# Patient Record
Sex: FEMALE | Employment: UNEMPLOYED | URBAN - METROPOLITAN AREA
[De-identification: names, ages, dates, MRNs, and addresses within clinical notes are randomized per-mention and may not be internally consistent; named-entity substitution may affect disease eponyms.]

---

## 2019-03-04 ENCOUNTER — OFFICE VISIT (OUTPATIENT)
Dept: URGENT CARE | Facility: CLINIC | Age: 54
End: 2019-03-04
Payer: COMMERCIAL

## 2019-03-04 VITALS
WEIGHT: 157.63 LBS | BODY MASS INDEX: 24.74 KG/M2 | RESPIRATION RATE: 16 BRPM | OXYGEN SATURATION: 99 % | HEART RATE: 84 BPM | SYSTOLIC BLOOD PRESSURE: 143 MMHG | DIASTOLIC BLOOD PRESSURE: 91 MMHG | HEIGHT: 67 IN | TEMPERATURE: 98 F

## 2019-03-04 DIAGNOSIS — B02.8 HERPES ZOSTER WITH COMPLICATION: Primary | ICD-10-CM

## 2019-03-04 PROCEDURE — 99204 OFFICE O/P NEW MOD 45 MIN: CPT | Mod: S$GLB,,, | Performed by: NURSE PRACTITIONER

## 2019-03-04 PROCEDURE — 99204 PR OFFICE/OUTPT VISIT, NEW, LEVL IV, 45-59 MIN: ICD-10-PCS | Mod: S$GLB,,, | Performed by: NURSE PRACTITIONER

## 2019-03-04 RX ORDER — GABAPENTIN 100 MG/1
300 CAPSULE ORAL 3 TIMES DAILY PRN
Qty: 30 CAPSULE | Refills: 0 | Status: SHIPPED | OUTPATIENT
Start: 2019-03-04 | End: 2022-09-14

## 2019-03-04 RX ORDER — ONDANSETRON 4 MG/1
4 TABLET, ORALLY DISINTEGRATING ORAL EVERY 6 HOURS PRN
Qty: 30 TABLET | Refills: 0 | Status: SHIPPED | OUTPATIENT
Start: 2019-03-04 | End: 2022-09-14

## 2019-03-04 RX ORDER — VALACYCLOVIR HYDROCHLORIDE 1 G/1
1000 TABLET, FILM COATED ORAL 3 TIMES DAILY
Qty: 21 TABLET | Refills: 0 | Status: ON HOLD | OUTPATIENT
Start: 2019-03-04 | End: 2022-11-08 | Stop reason: CLARIF

## 2019-03-04 NOTE — PATIENT INSTRUCTIONS

## 2019-03-04 NOTE — PROGRESS NOTES
"Subjective:       Patient ID: Rut Hurtado is a 53 y.o. female.    Vitals:  height is 5' 7" (1.702 m) and weight is 71.5 kg (157 lb 9.6 oz). Her temperature is 97.5 °F (36.4 °C). Her blood pressure is 143/91 (abnormal) and her pulse is 84. Her respiration is 16 and oxygen saturation is 99%.     Chief Complaint: Rash    Pain in right shoulder started a few days ago then small rash started on forearm and spread to hand c/o pain and itching   Ibuporfen did not help with pain       Rash   This is a new problem. The current episode started in the past 7 days. The problem has been gradually worsening since onset. The affected locations include the right hand and right arm. The rash is characterized by pain and itchiness. Pertinent negatives include no congestion, cough, diarrhea, fatigue, fever, shortness of breath, sore throat or vomiting. (Nausea ) Past treatments include antibiotic cream. The treatment provided mild relief.       Constitution: Negative for chills, fatigue and fever.   HENT: Negative for congestion and sore throat.    Neck: Negative for painful lymph nodes.   Cardiovascular: Negative for chest pain and leg swelling.   Eyes: Negative for double vision and blurred vision.   Respiratory: Negative for cough and shortness of breath.    Gastrointestinal: Negative for nausea, vomiting and diarrhea.   Genitourinary: Negative for dysuria, frequency, urgency and history of kidney stones.   Musculoskeletal: Negative for joint pain, joint swelling, muscle cramps and muscle ache.   Skin: Positive for rash, lesion and erythema. Negative for color change, pale and bruising.   Allergic/Immunologic: Negative for seasonal allergies.   Neurological: Negative for dizziness, history of vertigo, light-headedness, passing out and headaches.   Hematologic/Lymphatic: Negative for swollen lymph nodes.   Psychiatric/Behavioral: Negative for nervous/anxious, sleep disturbance and depression. The patient is not nervous/anxious.     "    Objective:      Physical Exam   Constitutional: She is oriented to person, place, and time. She appears well-developed and well-nourished. She is cooperative.  Non-toxic appearance. She does not appear ill. No distress.   HENT:   Head: Normocephalic and atraumatic.   Right Ear: Hearing, tympanic membrane, external ear and ear canal normal.   Left Ear: Hearing, tympanic membrane, external ear and ear canal normal.   Nose: Nose normal. No mucosal edema, rhinorrhea or nasal deformity. No epistaxis. Right sinus exhibits no maxillary sinus tenderness and no frontal sinus tenderness. Left sinus exhibits no maxillary sinus tenderness and no frontal sinus tenderness.   Mouth/Throat: Uvula is midline, oropharynx is clear and moist and mucous membranes are normal. No trismus in the jaw. Normal dentition. No uvula swelling. No posterior oropharyngeal erythema.   Eyes: Conjunctivae and lids are normal. Right eye exhibits no discharge. Left eye exhibits no discharge. No scleral icterus.   Sclera clear bilat   Neck: Trachea normal, normal range of motion, full passive range of motion without pain and phonation normal. Neck supple.   Cardiovascular: Normal rate, regular rhythm, normal heart sounds, intact distal pulses and normal pulses.   Pulmonary/Chest: Effort normal and breath sounds normal. No respiratory distress.   Abdominal: Soft. Normal appearance and bowel sounds are normal. She exhibits no distension, no pulsatile midline mass and no mass. There is no tenderness.   Musculoskeletal: Normal range of motion. She exhibits no edema or deformity.   Neurological: She is alert and oriented to person, place, and time. She exhibits normal muscle tone. Coordination normal.   Skin: Skin is warm, dry and intact. Rash noted. She is not diaphoretic. There is erythema. No pallor.        Psychiatric: She has a normal mood and affect. Her speech is normal and behavior is normal. Judgment and thought content normal. Cognition and  memory are normal.   Nursing note and vitals reviewed.      Assessment:       1. Herpes zoster with complication        Plan:         Herpes zoster with complication    Other orders  -     valACYclovir (VALTREX) 1000 MG tablet; Take 1 tablet (1,000 mg total) by mouth 3 (three) times daily. for 7 days  Dispense: 21 tablet; Refill: 0  -     gabapentin (NEURONTIN) 100 MG capsule; Take 3 capsules (300 mg total) by mouth 3 (three) times daily as needed.  Dispense: 30 capsule; Refill: 0  -     ondansetron (ZOFRAN-ODT) 4 MG TbDL; Take 1 tablet (4 mg total) by mouth every 6 (six) hours as needed.  Dispense: 30 tablet; Refill: 0

## 2019-12-16 PROBLEM — Z78.0 MENOPAUSE: Status: ACTIVE | Noted: 2019-12-16

## 2019-12-16 PROBLEM — E78.00 PURE HYPERCHOLESTEROLEMIA: Status: ACTIVE | Noted: 2019-12-16

## 2020-02-10 PROBLEM — R13.19 ESOPHAGEAL DYSPHAGIA: Status: ACTIVE | Noted: 2020-02-10

## 2020-02-10 PROBLEM — N63.0 BREAST MASS: Status: ACTIVE | Noted: 2020-02-10

## 2020-03-03 PROBLEM — R53.1 WEAKNESS: Status: ACTIVE | Noted: 2020-03-03

## 2021-10-07 ENCOUNTER — OFFICE VISIT (OUTPATIENT)
Dept: URGENT CARE | Facility: CLINIC | Age: 56
End: 2021-10-07
Payer: COMMERCIAL

## 2021-10-07 VITALS
HEART RATE: 81 BPM | DIASTOLIC BLOOD PRESSURE: 84 MMHG | TEMPERATURE: 99 F | RESPIRATION RATE: 20 BRPM | OXYGEN SATURATION: 98 % | HEIGHT: 67 IN | SYSTOLIC BLOOD PRESSURE: 134 MMHG | WEIGHT: 158 LBS | BODY MASS INDEX: 24.8 KG/M2

## 2021-10-07 DIAGNOSIS — S82.839A AVULSION FRACTURE OF DISTAL FIBULA: Primary | ICD-10-CM

## 2021-10-07 DIAGNOSIS — M25.571 ACUTE RIGHT ANKLE PAIN: ICD-10-CM

## 2021-10-07 PROCEDURE — 73610 X-RAY EXAM OF ANKLE: CPT | Mod: RT,S$GLB,, | Performed by: RADIOLOGY

## 2021-10-07 PROCEDURE — 73630 XR FOOT COMPLETE 3 VIEW RIGHT: ICD-10-PCS | Mod: RT,S$GLB,, | Performed by: RADIOLOGY

## 2021-10-07 PROCEDURE — 99214 PR OFFICE/OUTPT VISIT, EST, LEVL IV, 30-39 MIN: ICD-10-PCS | Mod: 25,S$GLB,, | Performed by: NURSE PRACTITIONER

## 2021-10-07 PROCEDURE — 29515 SPLINT APPLICATION: ICD-10-PCS | Mod: RT,S$GLB,, | Performed by: NURSE PRACTITIONER

## 2021-10-07 PROCEDURE — 3008F PR BODY MASS INDEX (BMI) DOCUMENTED: ICD-10-PCS | Mod: CPTII,S$GLB,, | Performed by: NURSE PRACTITIONER

## 2021-10-07 PROCEDURE — 3079F PR MOST RECENT DIASTOLIC BLOOD PRESSURE 80-89 MM HG: ICD-10-PCS | Mod: CPTII,S$GLB,, | Performed by: NURSE PRACTITIONER

## 2021-10-07 PROCEDURE — 29515 APPLICATION SHORT LEG SPLINT: CPT | Mod: RT,S$GLB,, | Performed by: NURSE PRACTITIONER

## 2021-10-07 PROCEDURE — 3075F SYST BP GE 130 - 139MM HG: CPT | Mod: CPTII,S$GLB,, | Performed by: NURSE PRACTITIONER

## 2021-10-07 PROCEDURE — 1159F PR MEDICATION LIST DOCUMENTED IN MEDICAL RECORD: ICD-10-PCS | Mod: CPTII,S$GLB,, | Performed by: NURSE PRACTITIONER

## 2021-10-07 PROCEDURE — 73610 XR ANKLE COMPLETE 3 VIEW RIGHT: ICD-10-PCS | Mod: RT,S$GLB,, | Performed by: RADIOLOGY

## 2021-10-07 PROCEDURE — 99214 OFFICE O/P EST MOD 30 MIN: CPT | Mod: 25,S$GLB,, | Performed by: NURSE PRACTITIONER

## 2021-10-07 PROCEDURE — 3079F DIAST BP 80-89 MM HG: CPT | Mod: CPTII,S$GLB,, | Performed by: NURSE PRACTITIONER

## 2021-10-07 PROCEDURE — 73630 X-RAY EXAM OF FOOT: CPT | Mod: RT,S$GLB,, | Performed by: RADIOLOGY

## 2021-10-07 PROCEDURE — 1160F RVW MEDS BY RX/DR IN RCRD: CPT | Mod: CPTII,S$GLB,, | Performed by: NURSE PRACTITIONER

## 2021-10-07 PROCEDURE — 3008F BODY MASS INDEX DOCD: CPT | Mod: CPTII,S$GLB,, | Performed by: NURSE PRACTITIONER

## 2021-10-07 PROCEDURE — 3075F PR MOST RECENT SYSTOLIC BLOOD PRESS GE 130-139MM HG: ICD-10-PCS | Mod: CPTII,S$GLB,, | Performed by: NURSE PRACTITIONER

## 2021-10-07 PROCEDURE — 1160F PR REVIEW ALL MEDS BY PRESCRIBER/CLIN PHARMACIST DOCUMENTED: ICD-10-PCS | Mod: CPTII,S$GLB,, | Performed by: NURSE PRACTITIONER

## 2021-10-07 PROCEDURE — 29515 PR APPLY LOWER LEG SPLINT: ICD-10-PCS | Mod: RT,S$GLB,, | Performed by: EMERGENCY MEDICINE

## 2021-10-07 PROCEDURE — 29515 APPLICATION SHORT LEG SPLINT: CPT | Mod: RT,S$GLB,, | Performed by: EMERGENCY MEDICINE

## 2021-10-07 PROCEDURE — 1159F MED LIST DOCD IN RCRD: CPT | Mod: CPTII,S$GLB,, | Performed by: NURSE PRACTITIONER

## 2021-10-12 ENCOUNTER — OFFICE VISIT (OUTPATIENT)
Dept: ORTHOPEDICS | Facility: CLINIC | Age: 56
End: 2021-10-12
Payer: COMMERCIAL

## 2021-10-12 VITALS — RESPIRATION RATE: 18 BRPM | HEIGHT: 67 IN | BODY MASS INDEX: 24.8 KG/M2 | WEIGHT: 158 LBS

## 2021-10-12 DIAGNOSIS — S82.61XA CLOSED FRACTURE OF DISTAL LATERAL MALLEOLUS OF RIGHT FIBULA, INITIAL ENCOUNTER: Primary | ICD-10-CM

## 2021-10-12 PROCEDURE — 1159F MED LIST DOCD IN RCRD: CPT | Mod: CPTII,S$GLB,, | Performed by: ORTHOPAEDIC SURGERY

## 2021-10-12 PROCEDURE — 99203 PR OFFICE/OUTPT VISIT, NEW, LEVL III, 30-44 MIN: ICD-10-PCS | Mod: S$GLB,,, | Performed by: ORTHOPAEDIC SURGERY

## 2021-10-12 PROCEDURE — 99999 PR PBB SHADOW E&M-EST. PATIENT-LVL III: ICD-10-PCS | Mod: PBBFAC,,, | Performed by: ORTHOPAEDIC SURGERY

## 2021-10-12 PROCEDURE — 99999 PR PBB SHADOW E&M-EST. PATIENT-LVL III: CPT | Mod: PBBFAC,,, | Performed by: ORTHOPAEDIC SURGERY

## 2021-10-12 PROCEDURE — 1160F RVW MEDS BY RX/DR IN RCRD: CPT | Mod: CPTII,S$GLB,, | Performed by: ORTHOPAEDIC SURGERY

## 2021-10-12 PROCEDURE — 1160F PR REVIEW ALL MEDS BY PRESCRIBER/CLIN PHARMACIST DOCUMENTED: ICD-10-PCS | Mod: CPTII,S$GLB,, | Performed by: ORTHOPAEDIC SURGERY

## 2021-10-12 PROCEDURE — 1159F PR MEDICATION LIST DOCUMENTED IN MEDICAL RECORD: ICD-10-PCS | Mod: CPTII,S$GLB,, | Performed by: ORTHOPAEDIC SURGERY

## 2021-10-12 PROCEDURE — 3008F BODY MASS INDEX DOCD: CPT | Mod: CPTII,S$GLB,, | Performed by: ORTHOPAEDIC SURGERY

## 2021-10-12 PROCEDURE — 99203 OFFICE O/P NEW LOW 30 MIN: CPT | Mod: S$GLB,,, | Performed by: ORTHOPAEDIC SURGERY

## 2021-10-12 PROCEDURE — 3008F PR BODY MASS INDEX (BMI) DOCUMENTED: ICD-10-PCS | Mod: CPTII,S$GLB,, | Performed by: ORTHOPAEDIC SURGERY

## 2021-11-02 ENCOUNTER — HOSPITAL ENCOUNTER (OUTPATIENT)
Dept: RADIOLOGY | Facility: HOSPITAL | Age: 56
Discharge: HOME | End: 2021-11-02
Attending: ORTHOPAEDIC SURGERY
Payer: COMMERCIAL

## 2021-11-02 ENCOUNTER — OFFICE VISIT (OUTPATIENT)
Dept: ORTHOPEDICS | Facility: CLINIC | Age: 56
End: 2021-11-02
Payer: COMMERCIAL

## 2021-11-02 VITALS — HEIGHT: 67 IN | RESPIRATION RATE: 18 BRPM | BODY MASS INDEX: 24.8 KG/M2 | WEIGHT: 158 LBS

## 2021-11-02 DIAGNOSIS — S82.61XA CLOSED FRACTURE OF DISTAL LATERAL MALLEOLUS OF RIGHT FIBULA, INITIAL ENCOUNTER: ICD-10-CM

## 2021-11-02 DIAGNOSIS — S82.61XA CLOSED FRACTURE OF DISTAL LATERAL MALLEOLUS OF RIGHT FIBULA, INITIAL ENCOUNTER: Primary | ICD-10-CM

## 2021-11-02 PROCEDURE — 73610 XR ANKLE COMPLETE 3 VIEW RIGHT: ICD-10-PCS | Mod: 26,RT,, | Performed by: RADIOLOGY

## 2021-11-02 PROCEDURE — 99213 OFFICE O/P EST LOW 20 MIN: CPT | Mod: S$GLB,,, | Performed by: ORTHOPAEDIC SURGERY

## 2021-11-02 PROCEDURE — 99213 PR OFFICE/OUTPT VISIT, EST, LEVL III, 20-29 MIN: ICD-10-PCS | Mod: S$GLB,,, | Performed by: ORTHOPAEDIC SURGERY

## 2021-11-02 PROCEDURE — 3008F BODY MASS INDEX DOCD: CPT | Mod: CPTII,S$GLB,, | Performed by: ORTHOPAEDIC SURGERY

## 2021-11-02 PROCEDURE — 3008F PR BODY MASS INDEX (BMI) DOCUMENTED: ICD-10-PCS | Mod: CPTII,S$GLB,, | Performed by: ORTHOPAEDIC SURGERY

## 2021-11-02 PROCEDURE — 73610 X-RAY EXAM OF ANKLE: CPT | Mod: TC,PN,RT

## 2021-11-02 PROCEDURE — 1159F MED LIST DOCD IN RCRD: CPT | Mod: CPTII,S$GLB,, | Performed by: ORTHOPAEDIC SURGERY

## 2021-11-02 PROCEDURE — 99999 PR PBB SHADOW E&M-EST. PATIENT-LVL III: CPT | Mod: PBBFAC,,, | Performed by: ORTHOPAEDIC SURGERY

## 2021-11-02 PROCEDURE — 99999 PR PBB SHADOW E&M-EST. PATIENT-LVL III: ICD-10-PCS | Mod: PBBFAC,,, | Performed by: ORTHOPAEDIC SURGERY

## 2021-11-02 PROCEDURE — 73610 X-RAY EXAM OF ANKLE: CPT | Mod: 26,RT,, | Performed by: RADIOLOGY

## 2021-11-02 PROCEDURE — 1159F PR MEDICATION LIST DOCUMENTED IN MEDICAL RECORD: ICD-10-PCS | Mod: CPTII,S$GLB,, | Performed by: ORTHOPAEDIC SURGERY

## 2021-11-30 ENCOUNTER — HOSPITAL ENCOUNTER (OUTPATIENT)
Dept: RADIOLOGY | Facility: HOSPITAL | Age: 56
Discharge: HOME | End: 2021-11-30
Attending: ORTHOPAEDIC SURGERY
Payer: COMMERCIAL

## 2021-11-30 ENCOUNTER — OFFICE VISIT (OUTPATIENT)
Dept: ORTHOPEDICS | Facility: CLINIC | Age: 56
End: 2021-11-30
Payer: COMMERCIAL

## 2021-11-30 VITALS — BODY MASS INDEX: 24.8 KG/M2 | RESPIRATION RATE: 18 BRPM | WEIGHT: 158 LBS | HEIGHT: 67 IN

## 2021-11-30 DIAGNOSIS — S82.61XA CLOSED FRACTURE OF DISTAL LATERAL MALLEOLUS OF RIGHT FIBULA, INITIAL ENCOUNTER: ICD-10-CM

## 2021-11-30 DIAGNOSIS — S82.61XA CLOSED FRACTURE OF DISTAL LATERAL MALLEOLUS OF RIGHT FIBULA, INITIAL ENCOUNTER: Primary | ICD-10-CM

## 2021-11-30 PROCEDURE — 73610 X-RAY EXAM OF ANKLE: CPT | Mod: 26,RT,, | Performed by: RADIOLOGY

## 2021-11-30 PROCEDURE — 99213 OFFICE O/P EST LOW 20 MIN: CPT | Mod: S$GLB,,, | Performed by: ORTHOPAEDIC SURGERY

## 2021-11-30 PROCEDURE — 99213 PR OFFICE/OUTPT VISIT, EST, LEVL III, 20-29 MIN: ICD-10-PCS | Mod: S$GLB,,, | Performed by: ORTHOPAEDIC SURGERY

## 2021-11-30 PROCEDURE — 73610 X-RAY EXAM OF ANKLE: CPT | Mod: TC,PN,RT

## 2021-11-30 PROCEDURE — 99999 PR PBB SHADOW E&M-EST. PATIENT-LVL III: ICD-10-PCS | Mod: PBBFAC,,, | Performed by: ORTHOPAEDIC SURGERY

## 2021-11-30 PROCEDURE — 99999 PR PBB SHADOW E&M-EST. PATIENT-LVL III: CPT | Mod: PBBFAC,,, | Performed by: ORTHOPAEDIC SURGERY

## 2021-11-30 PROCEDURE — 73610 XR ANKLE COMPLETE 3 VIEW RIGHT: ICD-10-PCS | Mod: 26,RT,, | Performed by: RADIOLOGY

## 2022-08-09 ENCOUNTER — TELEPHONE (OUTPATIENT)
Dept: FAMILY MEDICINE | Facility: CLINIC | Age: 57
End: 2022-08-09
Payer: COMMERCIAL

## 2022-08-09 NOTE — TELEPHONE ENCOUNTER
I spoke with pt via phone. I advised her that Dr. Woody's template is currently closed for new pts. I have scheduled the pt to see AICHA Sandy for the first visit. All understanding voiced of date and time. As well as location.       ----- Message from Natanael Yusuf sent at 8/9/2022  2:59 PM CDT -----  Contact: pt at [phone]  Type:  Sooner Appointment Request    Caller is requesting a sooner appointment.  Caller declined first available appointment listed below.  Caller will not accept being placed on the waitlist and is requesting a message be sent to doctor.    Name of Caller:  pt  When is the first available appointment?  N/A  Symptoms:  est. care  Best Call Back Number:  [phone]  Additional Information:  pt is calling the office to schedule an appt to est. Care but nothing comes up in the sytem. Please call back and advise.

## 2022-09-14 ENCOUNTER — OFFICE VISIT (OUTPATIENT)
Dept: FAMILY MEDICINE | Facility: CLINIC | Age: 57
End: 2022-09-14
Payer: COMMERCIAL

## 2022-09-14 VITALS
HEART RATE: 75 BPM | RESPIRATION RATE: 16 BRPM | HEIGHT: 67 IN | DIASTOLIC BLOOD PRESSURE: 82 MMHG | OXYGEN SATURATION: 97 % | BODY MASS INDEX: 23.84 KG/M2 | WEIGHT: 151.88 LBS | TEMPERATURE: 99 F | SYSTOLIC BLOOD PRESSURE: 132 MMHG

## 2022-09-14 DIAGNOSIS — Z11.59 ENCOUNTER FOR HEPATITIS C SCREENING TEST FOR LOW RISK PATIENT: ICD-10-CM

## 2022-09-14 DIAGNOSIS — Z12.31 BREAST CANCER SCREENING BY MAMMOGRAM: ICD-10-CM

## 2022-09-14 DIAGNOSIS — E78.00 PURE HYPERCHOLESTEROLEMIA: ICD-10-CM

## 2022-09-14 DIAGNOSIS — Z11.4 ENCOUNTER FOR SCREENING FOR HIV: ICD-10-CM

## 2022-09-14 DIAGNOSIS — Z00.00 WELLNESS EXAMINATION: Primary | ICD-10-CM

## 2022-09-14 DIAGNOSIS — Z12.11 COLON CANCER SCREENING: ICD-10-CM

## 2022-09-14 PROCEDURE — 3079F PR MOST RECENT DIASTOLIC BLOOD PRESSURE 80-89 MM HG: ICD-10-PCS | Mod: CPTII,S$GLB,,

## 2022-09-14 PROCEDURE — 3008F PR BODY MASS INDEX (BMI) DOCUMENTED: ICD-10-PCS | Mod: CPTII,S$GLB,,

## 2022-09-14 PROCEDURE — 99386 PR PREVENTIVE VISIT,NEW,40-64: ICD-10-PCS | Mod: S$GLB,,,

## 2022-09-14 PROCEDURE — 1159F PR MEDICATION LIST DOCUMENTED IN MEDICAL RECORD: ICD-10-PCS | Mod: CPTII,S$GLB,,

## 2022-09-14 PROCEDURE — 1160F PR REVIEW ALL MEDS BY PRESCRIBER/CLIN PHARMACIST DOCUMENTED: ICD-10-PCS | Mod: CPTII,S$GLB,,

## 2022-09-14 PROCEDURE — 3079F DIAST BP 80-89 MM HG: CPT | Mod: CPTII,S$GLB,,

## 2022-09-14 PROCEDURE — 99999 PR PBB SHADOW E&M-EST. PATIENT-LVL IV: CPT | Mod: PBBFAC,,,

## 2022-09-14 PROCEDURE — 1160F RVW MEDS BY RX/DR IN RCRD: CPT | Mod: CPTII,S$GLB,,

## 2022-09-14 PROCEDURE — 99386 PREV VISIT NEW AGE 40-64: CPT | Mod: S$GLB,,,

## 2022-09-14 PROCEDURE — 3075F PR MOST RECENT SYSTOLIC BLOOD PRESS GE 130-139MM HG: ICD-10-PCS | Mod: CPTII,S$GLB,,

## 2022-09-14 PROCEDURE — 3008F BODY MASS INDEX DOCD: CPT | Mod: CPTII,S$GLB,,

## 2022-09-14 PROCEDURE — 99999 PR PBB SHADOW E&M-EST. PATIENT-LVL IV: ICD-10-PCS | Mod: PBBFAC,,,

## 2022-09-14 PROCEDURE — 3075F SYST BP GE 130 - 139MM HG: CPT | Mod: CPTII,S$GLB,,

## 2022-09-14 PROCEDURE — 1159F MED LIST DOCD IN RCRD: CPT | Mod: CPTII,S$GLB,,

## 2022-09-14 RX ORDER — ZINC GLUCONATE 50 MG
50 TABLET ORAL DAILY
COMMUNITY
End: 2023-09-15

## 2022-09-14 RX ORDER — ASPIRIN 325 MG
100 TABLET, DELAYED RELEASE (ENTERIC COATED) ORAL DAILY
COMMUNITY

## 2022-09-14 RX ORDER — ZINC GLUCONATE 50 MG
TABLET ORAL DAILY
COMMUNITY

## 2022-09-14 RX ORDER — ERGOCALCIFEROL 1.25 MG/1
50000 CAPSULE ORAL
COMMUNITY

## 2022-09-14 RX ORDER — GLUCOSAMINE/CHONDR SU A SOD 167-133 MG
500 CAPSULE ORAL NIGHTLY
COMMUNITY

## 2022-09-14 RX ORDER — IBUPROFEN 100 MG/5ML
1000 SUSPENSION, ORAL (FINAL DOSE FORM) ORAL DAILY
COMMUNITY

## 2022-09-14 NOTE — PROGRESS NOTES
Subjective:       Patient ID: Rut Hurtado is a 56 y.o. female.    Chief Complaint: Establish Care    Rut Hutrado is a 55 yo F pt that presents to the clinic to establish care. Denies any acute health complaints at this time. She does mention R wrist pain that has been present over the last week. She notices the pain when extending her wrist against resistance. Denies numbness, tingling, weakness of the hand. She denies known trauma but did recently lift a 10lbs weight with the hand. Fractured her ankle over a year ago and admits to still not being back to her baseline as far as activity because of the discomfort she experiences with this. Due for mammo and colonoscopy. Open to doing these. Previously on zetia for cholesterol but had muscle cramps       History reviewed. No pertinent past medical history.    Review of patient's allergies indicates:  No Known Allergies      Current Outpatient Medications:     ascorbic acid, vitamin C, (VITAMIN C) 1000 MG tablet, Take 1,000 mg by mouth once daily., Disp: , Rfl:     co-enzyme Q-10 50 mg capsule, Take 100 mg by mouth once daily., Disp: , Rfl:     ergocalciferol (VITAMIN D2) 50,000 unit Cap, Take 50,000 Units by mouth every 7 days., Disp: , Rfl:     estradioL (ESTRACE) 1 MG tablet, Take 1 tablet (1 mg total) by mouth once daily., Disp: 90 tablet, Rfl: 2    methylsulfonylmethane (MSM) 1,000 mg Cap, Take by mouth. 3000 mg, Disp: , Rfl:     niacin 500 MG Tab, Take 500 mg by mouth every evening., Disp: , Rfl:     zinc gluconate 50 mg tablet, Take 50 mg by mouth once daily., Disp: , Rfl:     valACYclovir (VALTREX) 1000 MG tablet, Take 1 tablet (1,000 mg total) by mouth 3 (three) times daily. for 7 days, Disp: 21 tablet, Rfl: 0    Review of Systems   Constitutional:  Positive for activity change. Negative for appetite change, chills, diaphoresis, fatigue, fever and unexpected weight change.   HENT:  Negative for congestion, ear pain, postnasal drip, rhinorrhea, sinus pressure,  "sneezing, sore throat and trouble swallowing.    Eyes:  Negative for pain, itching and visual disturbance.   Respiratory:  Negative for cough, chest tightness, shortness of breath and wheezing.    Cardiovascular:  Negative for chest pain, palpitations and leg swelling.   Gastrointestinal:  Negative for abdominal distention, abdominal pain, blood in stool, constipation, diarrhea, nausea and vomiting.   Endocrine: Negative for cold intolerance and heat intolerance.   Genitourinary:  Negative for difficulty urinating, dysuria, frequency, hematuria and urgency.   Musculoskeletal:  Positive for arthralgias. Negative for back pain, myalgias and neck pain.   Skin:  Negative for color change, pallor, rash and wound.   Neurological:  Negative for dizziness, syncope, weakness, numbness and headaches.   Hematological:  Negative for adenopathy.   Psychiatric/Behavioral:  Negative for behavioral problems. The patient is not nervous/anxious.      Objective:      /82 (BP Location: Right arm, Patient Position: Sitting, BP Method: Medium (Manual))   Pulse 75   Temp 98.7 °F (37.1 °C) (Oral)   Resp 16   Ht 5' 7" (1.702 m)   Wt 68.9 kg (151 lb 14.4 oz)   SpO2 97%   BMI 23.79 kg/m²   Physical Exam  Vitals reviewed.   Constitutional:       General: She is not in acute distress.     Appearance: Normal appearance. She is normal weight. She is not ill-appearing, toxic-appearing or diaphoretic.   HENT:      Head: Normocephalic.      Right Ear: External ear normal.      Left Ear: External ear normal.      Nose: Nose normal. No congestion or rhinorrhea.      Mouth/Throat:      Mouth: Mucous membranes are moist.      Pharynx: Oropharynx is clear.   Eyes:      General: No scleral icterus.        Right eye: No discharge.         Left eye: No discharge.      Extraocular Movements: Extraocular movements intact.      Conjunctiva/sclera: Conjunctivae normal.   Cardiovascular:      Rate and Rhythm: Normal rate and regular rhythm.      " Pulses: Normal pulses.      Heart sounds: Normal heart sounds. No murmur heard.    No friction rub. No gallop.   Pulmonary:      Effort: Pulmonary effort is normal. No respiratory distress.      Breath sounds: Normal breath sounds. No wheezing, rhonchi or rales.   Chest:      Chest wall: No tenderness.   Abdominal:      General: There is no distension.      Palpations: Abdomen is soft. There is no mass.      Tenderness: There is no abdominal tenderness. There is no guarding.   Musculoskeletal:         General: No swelling, tenderness, deformity or signs of injury. Normal range of motion.      Cervical back: Normal range of motion.      Right lower leg: No edema.      Left lower leg: No edema.   Skin:     General: Skin is warm and dry.      Capillary Refill: Capillary refill takes less than 2 seconds.      Coloration: Skin is not jaundiced.      Findings: No bruising, erythema, lesion or rash.   Neurological:      Mental Status: She is alert and oriented to person, place, and time.      Gait: Gait normal.   Psychiatric:         Mood and Affect: Mood normal.         Behavior: Behavior normal.         Thought Content: Thought content normal.         Judgment: Judgment normal.       Assessment:       1. Wellness examination    2. Pure hypercholesterolemia    3. Encounter for hepatitis C screening test for low risk patient    4. Encounter for screening for HIV    5. Breast cancer screening by mammogram    6. Colon cancer screening          Plan:       Wellness examination  -     Comprehensive Metabolic Panel; Future; Expected date: 09/14/2022  -     Hemoglobin A1C; Future; Expected date: 09/14/2022  -     Lipid Panel; Future; Expected date: 09/14/2022  -     CBC Auto Differential; Future; Expected date: 09/14/2022  -     HIV 1/2 Ag/Ab (4th Gen); Future; Expected date: 09/14/2022  -     Hepatitis C Antibody; Future; Expected date: 09/14/2022    Pure hypercholesterolemia  -     Lipid Panel; Future; Expected date:  09/14/2022    Encounter for hepatitis C screening test for low risk patient  -     Hepatitis C Antibody; Future; Expected date: 09/14/2022    Encounter for screening for HIV  -     HIV 1/2 Ag/Ab (4th Gen); Future; Expected date: 09/14/2022    Breast cancer screening by mammogram  -     Mammo Digital Screening Bilat w/ Nickolas; Future; Expected date: 09/14/2022    Colon cancer screening  -     Case Request Endoscopy: COLONOSCOPY        12 months [place] to establish        Adarsh Sandy PA-C  Family Medicine Physician Assistant       I spent a total of 20 minutes on the day of the visit.This includes face to face time and non-face to face time preparing to see the patient (eg, review of tests), obtaining and/or reviewing separately obtained history, documenting clinical information in the electronic or other health record, independently interpreting results and communicating results to the patient/family/caregiver, or care coordinator.      We have addressed [3] Low: 2 or more self-limited or minor problems / 1 stable chronic illness / 1 acute, uncomplicated illness or injury  The complexity of the data reviewed and analyzed for this visit was [3] Limited (Reviewed prior external note, ordered unique testing or reviewed the results of each unique test)   The risk of complications and/or morbidity or mortality are [3] Low risk   The level of Medical Decision Making for this visit is [3] Low

## 2022-09-14 NOTE — PATIENT INSTRUCTIONS
Kevin Bettencourt,     If you are due for any health screening(s) below please notify me so we can arrange them to be ordered and scheduled to maintain your health. Most healthy patients complete it. Don't lose out on improving your health.     Tests to Keep You Healthy    Mammogram: ORDERED BUT NOT SCHEDULED  Colon Cancer Screening: ORDERED BUT NOT SCHEDULED      Breast Cancer Screening    Breast cancer is the second most common cancer in women after skin cancer, and the second leading cause of death from cancer after lung cancer. Mammograms can detect breast cancer early, which significantly increases the chances of curing the cancer.      A screening mammogram is an x-ray image of the breasts used for early breast cancer detection. It can help reduce the number of deaths from breast cancer among women. To get a clear image, the breast is placed between two plastic plates to make it flat. How often a mammogram is needed depends on your age and your breast cancer risk.    Although you are still overdue for this important screening, due to the COVID-19 pandemic, we recommend scheduling it in the near future.  Colon Cancer Screening    Of cancers affecting both men and women, colorectal cancer is the third leading cancer killer in the [place]. But it doesnt have to be. Screening can prevent colorectal cancer or find it at an early stage when treatment often leads to a cure.    A colonoscopy is the preferred test for detecting colon cancer. It is needed only once every 10 years if results are negative. While sedated, a flexible, lighted tube with a tiny camera is inserted into the rectum and advanced through the colon to look for cancers. An alternative screening test that is used at home and returned to the lab may also be used. It detects hidden blood in bowel movements which could indicate cancer in the colon. If results are positive, you will need a colonoscopy to determine if the blood is a sign of cancer. This type  of follow up (diagnostic) colonoscopy usually requires additional copays as required by your insurance provider. Please contact your PCP if you have any questions.    Although you are still overdue for this important screening, due to the COVID-19 pandemic, we recommend scheduling it in the near future.

## 2022-09-15 ENCOUNTER — TELEPHONE (OUTPATIENT)
Dept: GASTROENTEROLOGY | Facility: CLINIC | Age: 57
End: 2022-09-15
Payer: COMMERCIAL

## 2022-09-15 ENCOUNTER — LAB VISIT (OUTPATIENT)
Dept: LAB | Facility: HOSPITAL | Age: 57
End: 2022-09-15
Payer: COMMERCIAL

## 2022-09-15 DIAGNOSIS — Z11.59 ENCOUNTER FOR HEPATITIS C SCREENING TEST FOR LOW RISK PATIENT: ICD-10-CM

## 2022-09-15 DIAGNOSIS — Z11.4 ENCOUNTER FOR SCREENING FOR HIV: ICD-10-CM

## 2022-09-15 DIAGNOSIS — E78.00 PURE HYPERCHOLESTEROLEMIA: ICD-10-CM

## 2022-09-15 DIAGNOSIS — Z00.00 WELLNESS EXAMINATION: ICD-10-CM

## 2022-09-15 LAB
ALBUMIN SERPL BCP-MCNC: 4.1 G/DL (ref 3.5–5.2)
ALP SERPL-CCNC: 85 U/L (ref 55–135)
ALT SERPL W/O P-5'-P-CCNC: 16 U/L (ref 10–44)
ANION GAP SERPL CALC-SCNC: 5 MMOL/L (ref 8–16)
AST SERPL-CCNC: 19 U/L (ref 10–40)
BASOPHILS # BLD AUTO: 0.02 K/UL (ref 0–0.2)
BASOPHILS NFR BLD: 0.4 % (ref 0–1.9)
BILIRUB SERPL-MCNC: 0.5 MG/DL (ref 0.1–1)
BUN SERPL-MCNC: 8 MG/DL (ref 6–20)
CALCIUM SERPL-MCNC: 10.2 MG/DL (ref 8.7–10.5)
CHLORIDE SERPL-SCNC: 107 MMOL/L (ref 95–110)
CHOLEST SERPL-MCNC: 285 MG/DL (ref 120–199)
CHOLEST/HDLC SERPL: 4.6 {RATIO} (ref 2–5)
CO2 SERPL-SCNC: 28 MMOL/L (ref 23–29)
CREAT SERPL-MCNC: 0.8 MG/DL (ref 0.5–1.4)
DIFFERENTIAL METHOD: ABNORMAL
EOSINOPHIL # BLD AUTO: 0.1 K/UL (ref 0–0.5)
EOSINOPHIL NFR BLD: 1.4 % (ref 0–8)
ERYTHROCYTE [DISTWIDTH] IN BLOOD BY AUTOMATED COUNT: 13.1 % (ref 11.5–14.5)
EST. GFR  (NO RACE VARIABLE): >60 ML/MIN/1.73 M^2
ESTIMATED AVG GLUCOSE: 114 MG/DL (ref 68–131)
GLUCOSE SERPL-MCNC: 88 MG/DL (ref 70–110)
HBA1C MFR BLD: 5.6 % (ref 4–5.6)
HCT VFR BLD AUTO: 39.5 % (ref 37–48.5)
HCV AB SERPL QL IA: NORMAL
HDLC SERPL-MCNC: 62 MG/DL (ref 40–75)
HDLC SERPL: 21.8 % (ref 20–50)
HGB BLD-MCNC: 13.2 G/DL (ref 12–16)
HIV 1+2 AB+HIV1 P24 AG SERPL QL IA: NORMAL
IMM GRANULOCYTES # BLD AUTO: 0 K/UL (ref 0–0.04)
IMM GRANULOCYTES NFR BLD AUTO: 0 % (ref 0–0.5)
LDLC SERPL CALC-MCNC: 207.4 MG/DL (ref 63–159)
LYMPHOCYTES # BLD AUTO: 2.1 K/UL (ref 1–4.8)
LYMPHOCYTES NFR BLD: 41.1 % (ref 18–48)
MCH RBC QN AUTO: 31.2 PG (ref 27–31)
MCHC RBC AUTO-ENTMCNC: 33.4 G/DL (ref 32–36)
MCV RBC AUTO: 93 FL (ref 82–98)
MONOCYTES # BLD AUTO: 0.5 K/UL (ref 0.3–1)
MONOCYTES NFR BLD: 9 % (ref 4–15)
NEUTROPHILS # BLD AUTO: 2.5 K/UL (ref 1.8–7.7)
NEUTROPHILS NFR BLD: 48.1 % (ref 38–73)
NONHDLC SERPL-MCNC: 223 MG/DL
NRBC BLD-RTO: 0 /100 WBC
PLATELET # BLD AUTO: 259 K/UL (ref 150–450)
PMV BLD AUTO: 10.2 FL (ref 9.2–12.9)
POTASSIUM SERPL-SCNC: 4.2 MMOL/L (ref 3.5–5.1)
PROT SERPL-MCNC: 7.7 G/DL (ref 6–8.4)
RBC # BLD AUTO: 4.23 M/UL (ref 4–5.4)
SODIUM SERPL-SCNC: 140 MMOL/L (ref 136–145)
TRIGL SERPL-MCNC: 78 MG/DL (ref 30–150)
WBC # BLD AUTO: 5.09 K/UL (ref 3.9–12.7)

## 2022-09-15 PROCEDURE — 87389 HIV-1 AG W/HIV-1&-2 AB AG IA: CPT

## 2022-09-15 PROCEDURE — 83036 HEMOGLOBIN GLYCOSYLATED A1C: CPT

## 2022-09-15 PROCEDURE — 80053 COMPREHEN METABOLIC PANEL: CPT

## 2022-09-15 PROCEDURE — 36415 COLL VENOUS BLD VENIPUNCTURE: CPT | Mod: PO

## 2022-09-15 PROCEDURE — 86803 HEPATITIS C AB TEST: CPT

## 2022-09-15 PROCEDURE — 85025 COMPLETE CBC W/AUTO DIFF WBC: CPT

## 2022-09-15 PROCEDURE — 80061 LIPID PANEL: CPT

## 2022-09-15 NOTE — TELEPHONE ENCOUNTER
Left message for patient to call office to schedule colonoscopy.No Portal message sent not active

## 2022-09-28 ENCOUNTER — HOSPITAL ENCOUNTER (OUTPATIENT)
Dept: RADIOLOGY | Facility: HOSPITAL | Age: 57
Discharge: HOME | End: 2022-09-28
Payer: COMMERCIAL

## 2022-09-28 DIAGNOSIS — Z12.31 BREAST CANCER SCREENING BY MAMMOGRAM: ICD-10-CM

## 2022-09-28 PROCEDURE — 77067 SCR MAMMO BI INCL CAD: CPT | Mod: TC,PO

## 2022-09-29 ENCOUNTER — TELEPHONE (OUTPATIENT)
Dept: GASTROENTEROLOGY | Facility: CLINIC | Age: 57
End: 2022-09-29
Payer: COMMERCIAL

## 2022-09-29 NOTE — TELEPHONE ENCOUNTER
Colonoscopy 11/8 at 1030am arrive for 930am instructions reviewed and patient states understanding.  Copy mailed to patient

## 2022-10-05 ENCOUNTER — TELEPHONE (OUTPATIENT)
Dept: FAMILY MEDICINE | Facility: CLINIC | Age: 57
End: 2022-10-05
Payer: COMMERCIAL

## 2022-10-05 NOTE — TELEPHONE ENCOUNTER
----- Message from Steven Skaggs sent at 10/5/2022  1:43 PM CDT -----  Type: Needs Medical Advice  Who Called:  Pt    Best Call Back Number: [phone] (  Additional Information: Pt sts she would like a call back in regards to her blood work.  Please advise -- Thank you

## 2022-10-05 NOTE — TELEPHONE ENCOUNTER
Patient is asking to speak with Adarsh regarding her blood work. I told patient I can send a message to you regarding her concerns but she wants to talk to you directly. Please advise

## 2022-11-08 ENCOUNTER — ANESTHESIA EVENT (OUTPATIENT)
Dept: ENDOSCOPY | Facility: HOSPITAL | Age: 57
End: 2022-11-08
Payer: COMMERCIAL

## 2022-11-08 ENCOUNTER — ANESTHESIA (OUTPATIENT)
Dept: ENDOSCOPY | Facility: HOSPITAL | Age: 57
End: 2022-11-08
Payer: COMMERCIAL

## 2022-11-08 ENCOUNTER — HOSPITAL ENCOUNTER (OUTPATIENT)
Facility: HOSPITAL | Age: 57
Discharge: HOME | End: 2022-11-08
Attending: INTERNAL MEDICINE | Admitting: INTERNAL MEDICINE
Payer: COMMERCIAL

## 2022-11-08 VITALS
BODY MASS INDEX: 23.65 KG/M2 | DIASTOLIC BLOOD PRESSURE: 83 MMHG | TEMPERATURE: 98 F | WEIGHT: 151 LBS | HEART RATE: 59 BPM | RESPIRATION RATE: 16 BRPM | SYSTOLIC BLOOD PRESSURE: 141 MMHG | OXYGEN SATURATION: 100 %

## 2022-11-08 DIAGNOSIS — Z12.11 SCREEN FOR COLON CANCER: ICD-10-CM

## 2022-11-08 DIAGNOSIS — Z80.0 FAMILY HISTORY OF COLON CANCER IN MOTHER: Primary | ICD-10-CM

## 2022-11-08 PROCEDURE — D9220A PRA ANESTHESIA: ICD-10-PCS | Mod: ANES,,, | Performed by: ANESTHESIOLOGY

## 2022-11-08 PROCEDURE — 25000003 PHARM REV CODE 250: Performed by: NURSE ANESTHETIST, CERTIFIED REGISTERED

## 2022-11-08 PROCEDURE — G0105 COLORECTAL SCRN; HI RISK IND: HCPCS | Performed by: INTERNAL MEDICINE

## 2022-11-08 PROCEDURE — G0105 COLORECTAL SCRN; HI RISK IND: HCPCS | Mod: ,,, | Performed by: INTERNAL MEDICINE

## 2022-11-08 PROCEDURE — D9220A PRA ANESTHESIA: Mod: ANES,,, | Performed by: ANESTHESIOLOGY

## 2022-11-08 PROCEDURE — G0105 COLORECTAL SCRN; HI RISK IND: ICD-10-PCS | Mod: ,,, | Performed by: INTERNAL MEDICINE

## 2022-11-08 PROCEDURE — 37000008 HC ANESTHESIA 1ST 15 MINUTES: Performed by: INTERNAL MEDICINE

## 2022-11-08 PROCEDURE — 37000009 HC ANESTHESIA EA ADD 15 MINS: Performed by: INTERNAL MEDICINE

## 2022-11-08 PROCEDURE — D9220A PRA ANESTHESIA: Mod: CRNA,,, | Performed by: NURSE ANESTHETIST, CERTIFIED REGISTERED

## 2022-11-08 PROCEDURE — 63600175 PHARM REV CODE 636 W HCPCS: Performed by: NURSE ANESTHETIST, CERTIFIED REGISTERED

## 2022-11-08 PROCEDURE — 25000003 PHARM REV CODE 250: Performed by: INTERNAL MEDICINE

## 2022-11-08 PROCEDURE — D9220A PRA ANESTHESIA: ICD-10-PCS | Mod: CRNA,,, | Performed by: NURSE ANESTHETIST, CERTIFIED REGISTERED

## 2022-11-08 RX ORDER — LIDOCAINE HCL/PF 100 MG/5ML
SYRINGE (ML) INTRAVENOUS
Status: DISCONTINUED | OUTPATIENT
Start: 2022-11-08 | End: 2022-11-08

## 2022-11-08 RX ORDER — SODIUM CHLORIDE 9 MG/ML
INJECTION, SOLUTION INTRAVENOUS CONTINUOUS
Status: DISCONTINUED | OUTPATIENT
Start: 2022-11-08 | End: 2022-11-08 | Stop reason: HOSPADM

## 2022-11-08 RX ORDER — PROPOFOL 10 MG/ML
VIAL (ML) INTRAVENOUS
Status: DISCONTINUED | OUTPATIENT
Start: 2022-11-08 | End: 2022-11-08

## 2022-11-08 RX ADMIN — PROPOFOL 50 MG: 10 INJECTION, EMULSION INTRAVENOUS at 10:11

## 2022-11-08 RX ADMIN — PROPOFOL 100 MG: 10 INJECTION, EMULSION INTRAVENOUS at 10:11

## 2022-11-08 RX ADMIN — LIDOCAINE HYDROCHLORIDE 100 MG: 20 INJECTION INTRAVENOUS at 10:11

## 2022-11-08 RX ADMIN — SODIUM CHLORIDE: 0.9 INJECTION, SOLUTION INTRAVENOUS at 10:11

## 2022-11-08 NOTE — ANESTHESIA POSTPROCEDURE EVALUATION
Anesthesia Post Evaluation    Patient: Rut Hurtado    Procedure(s) Performed: Procedure(s) (LRB):  COLONOSCOPY (N/A)    Final Anesthesia Type: general      Patient location during evaluation: PACU  Patient participation: Yes- Able to Participate  Level of consciousness: awake and alert and oriented  Post-procedure vital signs: reviewed and stable  Pain management: adequate  Airway patency: patent    PONV status at discharge: No PONV  Anesthetic complications: no      Cardiovascular status: blood pressure returned to baseline  Respiratory status: unassisted, spontaneous ventilation and room air  Hydration status: euvolemic  Follow-up not needed.          Vitals Value Taken Time   /83 11/08/22 1110   Temp 36.4 °C (97.5 °F) 11/08/22 1110   Pulse 59 11/08/22 1110   Resp 16 11/08/22 1110   SpO2 100 % 11/08/22 1110         Event Time   Out of Recovery 11:31:53         Pain/Erwin Score: Erwin Score: 10 (11/8/2022 11:10 AM)

## 2022-11-08 NOTE — H&P
[facility] Note    CC: Histoyr of polyps, family history of colon cancer    HPI 56 y.o. female presents for colonoscopy, last > 10 years ago and she believes a polyp was removed. Her mother  of colon cancer in her early 40's.     History reviewed. No pertinent past medical history.    Allergies and Medications reviewed     Review of Systems  General ROS: negative for - chills, fever or weight loss  Cardiovascular ROS: no chest pain or dyspnea on exertion  Gastrointestinal ROS: no blood in stool     Physical Examination  There were no vitals taken for this visit.  General appearance: alert, cooperative, no distress  HENT: Normocephalic, atraumatic, neck symmetrical, no nasal discharge, sclera anicteric   Lungs: clear to auscultation bilaterally, symmetric chest wall expansion bilaterally  Heart: regular rate and rhythm without rub; no displacement of the PMI   Abdomen: soft  Extremities: extremities symmetric; no clubbing, cyanosis, or edema        Labs:  Lab Results   Component Value Date    WBC 5.09 09/15/2022    HGB 13.2 09/15/2022    HCT 39.5 09/15/2022    MCV 93 09/15/2022     09/15/2022       Assessment:   56 y.o. female presents for colonoscopy    Plan:  -Proceed to colonoscopy     Tracy Calix MD  [facility]  [address], [address]  [place] [address]  Office: [phone]  Fax: [phone]

## 2022-11-08 NOTE — ANESTHESIA PREPROCEDURE EVALUATION
11/08/2022  Rut Hurtado is a 56 y.o., female.      Pre-op Assessment    I have reviewed the NPO Status.      Review of Systems  Anesthesia Hx:  No problems with previous Anesthesia    Cardiovascular:   Exercise tolerance: good    Pulmonary:  Pulmonary Normal    Hepatic/GI:   Bowel Prep.        Physical Exam  General: Well nourished        Anesthesia Plan  Type of Anesthesia, risks & benefits discussed:    Anesthesia Type: Gen Natural Airway  Intra-op Monitoring Plan: Standard ASA Monitors  Induction:  IV  Informed Consent: Informed consent signed with the Patient and all parties understand the risks and agree with anesthesia plan.  All questions answered.   ASA Score: 1    Ready For Surgery From Anesthesia Perspective.     .

## 2022-11-08 NOTE — TRANSFER OF CARE
Anesthesia Transfer of Care Note    Patient: Rut Hurtado    Procedure(s) Performed: Procedure(s) (LRB):  COLONOSCOPY (N/A)    Patient location: GI    Anesthesia Type: general    Transport from OR: Transported from OR on room air with adequate spontaneous ventilation    Post pain: adequate analgesia    Post assessment: no apparent anesthetic complications and tolerated procedure well    Post vital signs: stable    Level of consciousness: awake, alert and oriented    Nausea/Vomiting: no nausea/vomiting    Complications: none    Transfer of care protocol was followed      Last vitals:   Visit Vitals  BP (!) 143/84 (BP Location: Left arm, Patient Position: Lying)   Pulse 85   Temp 36.7 °C (98.1 °F) (Skin)   Resp 19   Wt 68.5 kg (151 lb)   SpO2 99%   Breastfeeding No   BMI 23.65 kg/m²

## 2022-11-08 NOTE — OR NURSING
Vss, robert po fluids, denies pain, ambulates easily. IV removed, catheter intact. Discharge instructions provided and states understanding. States ready to go home.  Discharged from facility with family per wheelchair.  @[id number]

## 2022-11-08 NOTE — [employer] PATIENT INSTRUCTIONS
Discharge Summary/Instructions after an Endoscopic Procedure  Patient Name: Rut Hurtado  Patient MRN: [id number]  Patient YOB: 1965 Tuesday, November 8, 2022  Tracy Calix MD  Dear patient,  As a result of recent federal legislation (The Federal Cures Act), you may   receive lab or pathology results from your procedure in your [facility]   account before your physician is able to contact you. Your physician or   their representative will relay the results to you with their   recommendations at their soonest availability.  Thank you,  RESTRICTIONS:  During your procedure today, you received medications for sedation.  These   medications may affect your judgment, balance and coordination.  Therefore,   for 24 hours, you have the following restrictions:   - DO NOT drive a car, operate machinery, make legal/financial decisions,   sign important papers or drink alcohol.    ACTIVITY:  Today: no heavy lifting, straining or running due to procedural   sedation/anesthesia.  The following day: return to full activity including work.  DIET:  Eat and drink normally unless instructed otherwise.     TREATMENT FOR COMMON SIDE EFFECTS:  - Mild abdominal pain, nausea, belching, bloating or excessive gas:  rest,   eat lightly and use a heating pad.  - Sore Throat: treat with throat lozenges and/or gargle with warm salt   water.  - Because air was used during the procedure, expelling large amounts of air   from your rectum or belching is normal.  - If a bowel prep was taken, you may not have a bowel movement for 1-3 days.    This is normal.  SYMPTOMS TO WATCH FOR AND REPORT TO YOUR PHYSICIAN:  1. Abdominal pain or bloating, other than gas cramps.  2. Chest pain.  3. Back pain.  4. Signs of infection such as: chills or fever occurring within 24 hours   after the procedure.  5. Rectal bleeding, which would show as bright red, maroon, or black stools.   (A tablespoon of blood from the rectum is not serious,  especially if   hemorrhoids are present.)  6. Vomiting.  7. Weakness or dizziness.  GO DIRECTLY TO THE NEAREST EMERGENCY ROOM IF YOU HAVE ANY OF THE FOLLOWING:      Difficulty breathing              Chills and/or fever over 101 F   Persistent vomiting and/or vomiting blood   Severe abdominal pain   Severe chest pain   Black, tarry stools   Bleeding- more than one tablespoon   Any other symptom or condition that you feel may need urgent attention  Your doctor recommends these additional instructions:  If any biopsies were taken, your doctors clinic will contact you in 1 to 2   weeks with any results.  - Discharge patient to home (with escort).   - Patient has a contact number available for emergencies.  The signs and   symptoms of potential delayed complications were discussed with the   patient.  Return to normal activities tomorrow.  Written discharge   instructions were provided to the patient.   - Resume previous diet.   - Continue present medications.   - Repeat colonoscopy in 5 years for screening purposes.   - Return to my office PRN.  For questions, problems or results please call your physician - Tracy Calix MD at Work:  [phone].  [facility], EMERGENCY ROOM PHONE NUMBER: [phone]  IF A COMPLICATION OR EMERGENCY SITUATION ARISES AND YOU ARE UNABLE TO REACH   YOUR PHYSICIAN - GO DIRECTLY TO THE EMERGENCY ROOM.  Tracy Calix MD  11/8/2022 10:41:21 AM  This report has been verified and signed electronically.  Dear patient,  As a result of recent federal legislation (The Federal Cures Act), you may   receive lab or pathology results from your procedure in your [facility]   account before your physician is able to contact you. Your physician or   their representative will relay the results to you with their   recommendations at their soonest availability.  Thank you,  [employer]ATION

## 2022-12-18 ENCOUNTER — HOSPITAL ENCOUNTER (EMERGENCY)
Facility: HOSPITAL | Age: 57
Discharge: HOME | End: 2022-12-18
Attending: EMERGENCY MEDICINE
Payer: COMMERCIAL

## 2022-12-18 VITALS
OXYGEN SATURATION: 99 % | WEIGHT: 155 LBS | BODY MASS INDEX: 24.28 KG/M2 | SYSTOLIC BLOOD PRESSURE: 135 MMHG | DIASTOLIC BLOOD PRESSURE: 69 MMHG | HEART RATE: 105 BPM | RESPIRATION RATE: 16 BRPM | TEMPERATURE: 100 F

## 2022-12-18 DIAGNOSIS — U07.1 COVID-19: Primary | ICD-10-CM

## 2022-12-18 LAB
ALBUMIN SERPL BCP-MCNC: 4.3 G/DL (ref 3.5–5.2)
ALP SERPL-CCNC: 71 U/L (ref 55–135)
ALT SERPL W/O P-5'-P-CCNC: 14 U/L (ref 10–44)
ANION GAP SERPL CALC-SCNC: 8 MMOL/L (ref 8–16)
AST SERPL-CCNC: 19 U/L (ref 10–40)
BASOPHILS # BLD AUTO: 0.01 K/UL (ref 0–0.2)
BASOPHILS NFR BLD: 0.1 % (ref 0–1.9)
BILIRUB SERPL-MCNC: 0.9 MG/DL (ref 0.1–1)
BILIRUB UR QL STRIP: NEGATIVE
BUN SERPL-MCNC: 8 MG/DL (ref 6–20)
CALCIUM SERPL-MCNC: 10 MG/DL (ref 8.7–10.5)
CHLORIDE SERPL-SCNC: 105 MMOL/L (ref 95–110)
CLARITY UR: CLEAR
CO2 SERPL-SCNC: 24 MMOL/L (ref 23–29)
COLOR UR: YELLOW
CREAT SERPL-MCNC: 0.9 MG/DL (ref 0.5–1.4)
CREAT SERPL-MCNC: 1 MG/DL (ref 0.5–1.4)
DIFFERENTIAL METHOD: ABNORMAL
EOSINOPHIL # BLD AUTO: 0 K/UL (ref 0–0.5)
EOSINOPHIL NFR BLD: 0.2 % (ref 0–8)
ERYTHROCYTE [DISTWIDTH] IN BLOOD BY AUTOMATED COUNT: 12.4 % (ref 11.5–14.5)
EST. GFR  (NO RACE VARIABLE): >60 ML/MIN/1.73 M^2
GLUCOSE SERPL-MCNC: 103 MG/DL (ref 70–110)
GLUCOSE UR QL STRIP: NEGATIVE
HCT VFR BLD AUTO: 39.5 % (ref 37–48.5)
HGB BLD-MCNC: 13.3 G/DL (ref 12–16)
HGB UR QL STRIP: NEGATIVE
IMM GRANULOCYTES # BLD AUTO: 0.03 K/UL (ref 0–0.04)
IMM GRANULOCYTES NFR BLD AUTO: 0.4 % (ref 0–0.5)
INFLUENZA A, MOLECULAR: NEGATIVE
INFLUENZA B, MOLECULAR: NEGATIVE
KETONES UR QL STRIP: ABNORMAL
LEUKOCYTE ESTERASE UR QL STRIP: NEGATIVE
LIPASE SERPL-CCNC: 29 U/L (ref 4–60)
LYMPHOCYTES # BLD AUTO: 0.4 K/UL (ref 1–4.8)
LYMPHOCYTES NFR BLD: 4.3 % (ref 18–48)
MCH RBC QN AUTO: 30.4 PG (ref 27–31)
MCHC RBC AUTO-ENTMCNC: 33.7 G/DL (ref 32–36)
MCV RBC AUTO: 90 FL (ref 82–98)
MONOCYTES # BLD AUTO: 0.6 K/UL (ref 0.3–1)
MONOCYTES NFR BLD: 7 % (ref 4–15)
NEUTROPHILS # BLD AUTO: 7.2 K/UL (ref 1.8–7.7)
NEUTROPHILS NFR BLD: 88 % (ref 38–73)
NITRITE UR QL STRIP: NEGATIVE
NRBC BLD-RTO: 0 /100 WBC
PH UR STRIP: 8 [PH] (ref 5–8)
PLATELET # BLD AUTO: 231 K/UL (ref 150–450)
PMV BLD AUTO: 9.6 FL (ref 9.2–12.9)
POTASSIUM SERPL-SCNC: 3.8 MMOL/L (ref 3.5–5.1)
PROT SERPL-MCNC: 8.5 G/DL (ref 6–8.4)
PROT UR QL STRIP: NEGATIVE
RBC # BLD AUTO: 4.37 M/UL (ref 4–5.4)
SAMPLE: NORMAL
SARS-COV-2 RDRP RESP QL NAA+PROBE: POSITIVE
SODIUM SERPL-SCNC: 137 MMOL/L (ref 136–145)
SP GR UR STRIP: 1 (ref 1–1.03)
SPECIMEN SOURCE: NORMAL
URN SPEC COLLECT METH UR: ABNORMAL
UROBILINOGEN UR STRIP-ACNC: NEGATIVE EU/DL
WBC # BLD AUTO: 8.17 K/UL (ref 3.9–12.7)

## 2022-12-18 PROCEDURE — 85025 COMPLETE CBC W/AUTO DIFF WBC: CPT | Performed by: STUDENT IN AN ORGANIZED HEALTH CARE EDUCATION/TRAINING PROGRAM

## 2022-12-18 PROCEDURE — 96361 HYDRATE IV INFUSION ADD-ON: CPT

## 2022-12-18 PROCEDURE — 87502 INFLUENZA DNA AMP PROBE: CPT | Performed by: STUDENT IN AN ORGANIZED HEALTH CARE EDUCATION/TRAINING PROGRAM

## 2022-12-18 PROCEDURE — 81003 URINALYSIS AUTO W/O SCOPE: CPT | Performed by: STUDENT IN AN ORGANIZED HEALTH CARE EDUCATION/TRAINING PROGRAM

## 2022-12-18 PROCEDURE — 83690 ASSAY OF LIPASE: CPT | Performed by: STUDENT IN AN ORGANIZED HEALTH CARE EDUCATION/TRAINING PROGRAM

## 2022-12-18 PROCEDURE — 96375 TX/PRO/DX INJ NEW DRUG ADDON: CPT

## 2022-12-18 PROCEDURE — 63600175 PHARM REV CODE 636 W HCPCS: Performed by: STUDENT IN AN ORGANIZED HEALTH CARE EDUCATION/TRAINING PROGRAM

## 2022-12-18 PROCEDURE — 80053 COMPREHEN METABOLIC PANEL: CPT | Performed by: STUDENT IN AN ORGANIZED HEALTH CARE EDUCATION/TRAINING PROGRAM

## 2022-12-18 PROCEDURE — 96374 THER/PROPH/DIAG INJ IV PUSH: CPT

## 2022-12-18 PROCEDURE — 25000003 PHARM REV CODE 250: Performed by: EMERGENCY MEDICINE

## 2022-12-18 PROCEDURE — 25500020 PHARM REV CODE 255: Performed by: EMERGENCY MEDICINE

## 2022-12-18 PROCEDURE — U0002 COVID-19 LAB TEST NON-CDC: HCPCS | Performed by: STUDENT IN AN ORGANIZED HEALTH CARE EDUCATION/TRAINING PROGRAM

## 2022-12-18 PROCEDURE — 25000003 PHARM REV CODE 250: Performed by: STUDENT IN AN ORGANIZED HEALTH CARE EDUCATION/TRAINING PROGRAM

## 2022-12-18 PROCEDURE — 99285 EMERGENCY DEPT VISIT HI MDM: CPT | Mod: 25

## 2022-12-18 RX ORDER — ACETAMINOPHEN 500 MG
1000 TABLET ORAL
Status: COMPLETED | OUTPATIENT
Start: 2022-12-18 | End: 2022-12-18

## 2022-12-18 RX ORDER — ONDANSETRON 2 MG/ML
4 INJECTION INTRAMUSCULAR; INTRAVENOUS
Status: COMPLETED | OUTPATIENT
Start: 2022-12-18 | End: 2022-12-18

## 2022-12-18 RX ORDER — SODIUM CHLORIDE 9 MG/ML
1000 INJECTION, SOLUTION INTRAVENOUS
Status: COMPLETED | OUTPATIENT
Start: 2022-12-18 | End: 2022-12-18

## 2022-12-18 RX ORDER — ONDANSETRON HYDROCHLORIDE 4 MG/5ML
4 SOLUTION ORAL
Status: DISCONTINUED | OUTPATIENT
Start: 2022-12-18 | End: 2022-12-18

## 2022-12-18 RX ORDER — HYDROMORPHONE HYDROCHLORIDE 1 MG/ML
0.5 INJECTION, SOLUTION INTRAMUSCULAR; INTRAVENOUS; SUBCUTANEOUS
Status: COMPLETED | OUTPATIENT
Start: 2022-12-18 | End: 2022-12-18

## 2022-12-18 RX ADMIN — ACETAMINOPHEN 1000 MG: 500 TABLET ORAL at 05:12

## 2022-12-18 RX ADMIN — HYDROMORPHONE HYDROCHLORIDE 0.5 MG: 0.5 INJECTION, SOLUTION INTRAMUSCULAR; INTRAVENOUS; SUBCUTANEOUS at 03:12

## 2022-12-18 RX ADMIN — IOHEXOL 100 ML: 350 INJECTION, SOLUTION INTRAVENOUS at 04:12

## 2022-12-18 RX ADMIN — SODIUM CHLORIDE 1000 ML: 0.9 INJECTION, SOLUTION INTRAVENOUS at 03:12

## 2022-12-18 RX ADMIN — ONDANSETRON 4 MG: 2 INJECTION INTRAMUSCULAR; INTRAVENOUS at 03:12

## 2022-12-18 NOTE — ED PROVIDER NOTES
Encounter Date: 12/18/2022       History     Chief Complaint   Patient presents with    Abdominal Pain     RUQ w. Nausea since this AM     57-year-old female no significant past medical history presents emergency room today for evaluation of acute onset midepigastric and right upper quadrant abdominal pain associated with nausea no vomiting.  Patient is status post hysterectomy but no other intra-abdominal surgeries.  No constipation or diarrhea.  Complains of severe sharp, crampy pain.  It does not radiate outside of the midepigastrium and right upper quadrant.  No fevers or chills, cough or congestion.  No chest pain or shortness of breath.    Review of patient's allergies indicates:  No Known Allergies  No past medical history on file.  Past Surgical History:   Procedure Laterality Date    COLONOSCOPY N/A 11/8/2022    Procedure: COLONOSCOPY;  Surgeon: Tracy Calix MD;  Location: [facility];  Service: Endoscopy;  Laterality: N/A;    HYSTERECTOMY       Family History   Problem Relation Age of Onset    Cancer Mother         colon     Social History     Tobacco Use    Smoking status: Never    Smokeless tobacco: Never   Substance Use Topics    Alcohol use: No    Drug use: Never     Review of Systems   Constitutional:  Negative for chills, fatigue and fever.   HENT:  Negative for congestion, hearing loss, sore throat and trouble swallowing.    Eyes:  Negative for visual disturbance.   Respiratory:  Negative for cough, chest tightness and shortness of breath.    Cardiovascular:  Negative for chest pain.   Gastrointestinal:  Positive for abdominal pain and nausea. Negative for constipation, diarrhea and vomiting.   Endocrine: Negative for polyuria.   Genitourinary:  Negative for difficulty urinating.   Musculoskeletal:  Negative for arthralgias and myalgias.   Skin:  Negative for rash.   Neurological:  Negative for dizziness and headaches.   Psychiatric/Behavioral:  The patient is not nervous/anxious.      Physical  Exam     Initial Vitals [12/18/22 1416]   BP Pulse Resp Temp SpO2   122/69 (!) 126 17 99.9 °F (37.7 °C) 100 %      MAP       --         Physical Exam    Nursing note and vitals reviewed.  Constitutional: She appears well-developed and well-nourished.   HENT:   Head: Normocephalic and atraumatic.   Eyes: Conjunctivae are normal. Pupils are equal, round, and reactive to light.   Neck: Neck supple.   Normal range of motion.  Cardiovascular:  Normal rate, regular rhythm and normal heart sounds.           Pulmonary/Chest: Breath sounds normal.   Abdominal:   Patient has significant abdominal tenderness to the midepigastrium and right upper quadrant.  She is writhing in pain bedside.    Otherwise abdomen is flat, soft and nontender in all other quadrants. No peritoneal signs. No suprapubic pain. No CVAT. No guarding, no palpable masses.  No hepatosplenomegaly. No overlying skin changes. Normoactive bowel sounds. No bruits. Distal pulses 2+ b/l. No inguinal lymphadenopathy.  Negative Osborne's sign. Nontender at McBurney's point. No rebound tenderness, negative psoas sign. No pain with heel tap.     Musculoskeletal:         General: Normal range of motion.      Cervical back: Normal range of motion and neck supple.     Neurological: She is alert and oriented to person, place, and time. GCS score is 15. GCS eye subscore is 4. GCS verbal subscore is 5. GCS motor subscore is 6.   Skin: Skin is warm and dry. Capillary refill takes less than 2 seconds.   Psychiatric: She has a normal mood and affect. Her behavior is normal. Judgment and thought content normal.       ED Course   Procedures  Labs Reviewed   CBC W/ AUTO DIFFERENTIAL - Abnormal; Notable for the following components:       Result Value    Lymph # 0.4 (*)     Gran % 88.0 (*)     Lymph % 4.3 (*)     All other components within normal limits   COMPREHENSIVE METABOLIC PANEL - Abnormal; Notable for the following components:    Total Protein 8.5 (*)     All other  components within normal limits   URINALYSIS, REFLEX TO URINE CULTURE - Abnormal; Notable for the following components:    Ketones, UA 1+ (*)     All other components within normal limits    Narrative:     Specimen Source->Urine   SARS-COV-2 RNA AMPLIFICATION, QUAL - Abnormal; Notable for the following components:    SARS-CoV-2 RNA, Amplification, Qual Positive (*)     All other components within normal limits   LIPASE   INFLUENZA A AND B ANTIGEN    Narrative:     Specimen Source->Nasopharyngeal Swab   ISTAT CREATININE   POCT CREATININE          Imaging Results              CT Abdomen Pelvis With Contrast (Final result)  Result time 12/18/22 16:30:03      Final result by Teryr Prather MD (12/18/22 16:30:03)                   Narrative:    CT ABDOMEN AND PELVIS WITH CONTRAST    HISTORY: Abdominal pain    CMS MANDATED QUALITY DATA - CT RADIATION  436    All CT scans at this facility utilize dose modulation, iterative reconstruction, and/or weight based dosing when appropriate to reduce radiation dose to as low as reasonably achievable    FINDINGS:    Post infusion images were obtained from the lung bases to the pubic symphysis. 100 cc nonionic contrast was administered for the examination.    The lung bases are unremarkable.    The liver has a normal appearance. The gallbladder and biliary tree are within normal limits. The spleen is unremarkable. The pancreas, kidneys, and adrenal glands are normal. The abdominal aorta is normal in caliber.    There is no pathologic bowel wall thickening or evidence of obstruction. No free air or free fluid is identified. The appendix is normal.    Images of the pelvis demonstrate multiple cysts in both ovaries. The left ovary is enlarged as a result, measuring 7.3 cm in greatest transverse diameter. A dominant cyst on the right measures 4.2 cm, with smaller adjacent cysts noted. Similar findings to a lesser extent are demonstrated on a prior study from 2014.    No acute osseous  abnormality is identified.    IMPRESSION:      1. Multicystic and enlarged appearance of both ovaries, mildly increased compared to 2014. Follow-up ultrasound should be considered.  2. Normal appendix.  3. No other significant findings.    Electronically signed by:  Terry Prather MD  12/18/2022 4:30 PM CST Workstation: [id number]                                     US Abdomen Limited (Final result)  Result time 12/18/22 15:34:28      Final result by Terry Prather MD (12/18/22 15:34:28)                   Narrative:    Abdominal ultrasound Limited    CLINICAL DATA: Right upper quadrant pain    FINDINGS: Sonographic assessment targeted to the right upper quadrant was performed.    Visualized portions of the pancreas, aorta, and inferior vena cava are unremarkable.    The liver is normal in appearance. Hepatopedal flow is noted within the portal vein. The gallbladder and biliary tree are unremarkable. Common bile duct measures 5 mm.    The right kidney is normal. There is no right upper quadrant free fluid.    IMPRESSION:  1. Normal right upper quadrant ultrasound.    Electronically signed by:  Terry Prather MD  12/18/2022 3:34 PM CST Workstation: [id number]                                     Medications   HYDROmorphone injection 0.5 mg (0.5 mg Intravenous Given 12/18/22 1504)   ondansetron injection 4 mg (4 mg Intravenous Given 12/18/22 1509)   0.9%  NaCl infusion (0 mLs Intravenous Stopped 12/18/22 1740)   iohexoL (OMNIPAQUE 350) injection 100 mL (100 mLs Intravenous Given 12/18/22 1607)   acetaminophen tablet 1,000 mg (1,000 mg Oral Given 12/18/22 1704)     Medical Decision Making:   Initial Assessment:   Nontoxic, well-appearing and in no acute distress.  ED Management:  57-year-old female without significant past medical history presents emergency room for evaluation of acute onset midepigastric and right upper quadrant pain.  Patient had a thorough workup including labs, ultrasound and CT without  acute findings today.  Certainly nontoxic appearing.  Patient was found to be positive for COVID an incidental finding of ovarian cyst, not likely the source of patient's pain.  Patient had resolution of symptoms with 0.5 mg Dilaudid.  She would spiked a fever in the emergency room likely secondary to COVID so was given Tylenol.  Given benign presentation, plan to discharge home in stable condition with recommendation for primary care follow-up, treat COVID symptoms symptomatically.      Disposition:  Improved, discharged  Plan to discharge home with appropriate follow-up, including primary care manager.    I discussed the findings and plan of care with this patient.  All questions were answered to the patient's satisfaction.  Disposition plan as above.  Verbal and written discharge instructions provided to the patient on discharge.  Return precautions discussed prior to discharge.     I discuss this patient case with the cosigning physician, who agrees with diagnosis and plan of care. This note was written using the assistance of a dictation program and may contain grammatical errors.                         Clinical Impression:   Final diagnoses:  [U07.1] COVID-19 (Primary)        ED Disposition Condition    Discharge Stable          ED Prescriptions    None       Follow-up Information       Follow up With Specialties Details Why Contact Info Additional Information    Ernesto Woody MD Family Medicine Schedule an appointment as soon as possible for a visit in 1 week  [address] [facility] [id number]  [phone]       [facility] - Emergency Dept Emergency Medicine Go to  As needed, If symptoms worsen [address] [place] [id number] [phone] 1st floor             Claus De La Cruz PA-C  12/18/22 [id number]

## 2022-12-28 ENCOUNTER — OFFICE VISIT (OUTPATIENT)
Dept: FAMILY MEDICINE | Facility: CLINIC | Age: 57
End: 2022-12-28
Payer: COMMERCIAL

## 2022-12-28 VITALS
BODY MASS INDEX: 23.25 KG/M2 | HEART RATE: 80 BPM | DIASTOLIC BLOOD PRESSURE: 68 MMHG | OXYGEN SATURATION: 97 % | SYSTOLIC BLOOD PRESSURE: 132 MMHG | HEIGHT: 67 IN | WEIGHT: 148.13 LBS | TEMPERATURE: 99 F

## 2022-12-28 DIAGNOSIS — U07.1 COVID-19 VIRUS INFECTION: Primary | ICD-10-CM

## 2022-12-28 DIAGNOSIS — E78.00 PURE HYPERCHOLESTEROLEMIA: ICD-10-CM

## 2022-12-28 DIAGNOSIS — N83.209 CYST OF OVARY, UNSPECIFIED LATERALITY: ICD-10-CM

## 2022-12-28 DIAGNOSIS — Z79.890 HORMONE REPLACEMENT THERAPY (HRT): ICD-10-CM

## 2022-12-28 PROCEDURE — 99214 OFFICE O/P EST MOD 30 MIN: CPT | Mod: S$GLB,,, | Performed by: NURSE PRACTITIONER

## 2022-12-28 PROCEDURE — 3078F PR MOST RECENT DIASTOLIC BLOOD PRESSURE < 80 MM HG: ICD-10-PCS | Mod: CPTII,S$GLB,, | Performed by: NURSE PRACTITIONER

## 2022-12-28 PROCEDURE — 99999 PR PBB SHADOW E&M-EST. PATIENT-LVL V: CPT | Mod: PBBFAC,,, | Performed by: NURSE PRACTITIONER

## 2022-12-28 PROCEDURE — 3008F PR BODY MASS INDEX (BMI) DOCUMENTED: ICD-10-PCS | Mod: CPTII,S$GLB,, | Performed by: NURSE PRACTITIONER

## 2022-12-28 PROCEDURE — 99214 PR OFFICE/OUTPT VISIT, EST, LEVL IV, 30-39 MIN: ICD-10-PCS | Mod: S$GLB,,, | Performed by: NURSE PRACTITIONER

## 2022-12-28 PROCEDURE — 3008F BODY MASS INDEX DOCD: CPT | Mod: CPTII,S$GLB,, | Performed by: NURSE PRACTITIONER

## 2022-12-28 PROCEDURE — 1160F RVW MEDS BY RX/DR IN RCRD: CPT | Mod: CPTII,S$GLB,, | Performed by: NURSE PRACTITIONER

## 2022-12-28 PROCEDURE — 3075F PR MOST RECENT SYSTOLIC BLOOD PRESS GE 130-139MM HG: ICD-10-PCS | Mod: CPTII,S$GLB,, | Performed by: NURSE PRACTITIONER

## 2022-12-28 PROCEDURE — 3044F PR MOST RECENT HEMOGLOBIN A1C LEVEL <7.0%: ICD-10-PCS | Mod: CPTII,S$GLB,, | Performed by: NURSE PRACTITIONER

## 2022-12-28 PROCEDURE — 1160F PR REVIEW ALL MEDS BY PRESCRIBER/CLIN PHARMACIST DOCUMENTED: ICD-10-PCS | Mod: CPTII,S$GLB,, | Performed by: NURSE PRACTITIONER

## 2022-12-28 PROCEDURE — 3078F DIAST BP <80 MM HG: CPT | Mod: CPTII,S$GLB,, | Performed by: NURSE PRACTITIONER

## 2022-12-28 PROCEDURE — 1159F PR MEDICATION LIST DOCUMENTED IN MEDICAL RECORD: ICD-10-PCS | Mod: CPTII,S$GLB,, | Performed by: NURSE PRACTITIONER

## 2022-12-28 PROCEDURE — 3075F SYST BP GE 130 - 139MM HG: CPT | Mod: CPTII,S$GLB,, | Performed by: NURSE PRACTITIONER

## 2022-12-28 PROCEDURE — 99999 PR PBB SHADOW E&M-EST. PATIENT-LVL V: ICD-10-PCS | Mod: PBBFAC,,, | Performed by: NURSE PRACTITIONER

## 2022-12-28 PROCEDURE — 1159F MED LIST DOCD IN RCRD: CPT | Mod: CPTII,S$GLB,, | Performed by: NURSE PRACTITIONER

## 2022-12-28 PROCEDURE — 3044F HG A1C LEVEL LT 7.0%: CPT | Mod: CPTII,S$GLB,, | Performed by: NURSE PRACTITIONER

## 2022-12-28 NOTE — PROGRESS NOTES
Subjective:       Patient ID: Rut Hurtado is a 57 y.o. female.    Chief Complaint: Follow-up    HPI      Patient presents today for ER follow up. She is new to me. She is scheduled to establish care with PCP- on 9/15/23. Patient was seen in the ER for COVID infection on 12/18/22.  acute onset midepigastric and right upper quadrant abdominal pain associated with nausea no vomiting.  labs, ultrasound and CT without acute findings today.  Certainly nontoxic appearing.  Patient was found to be positive for COVID an incidental finding of ovarian cyst,     11/8/22 colonoscopy     No acute osseous abnormality is identified.     CT Abdomen Pelvis with Contrast 12/18/22 IMPRESSION:        1. Multicystic and enlarged appearance of both ovaries, mildly increased compared to 2014. Follow-up ultrasound should be considered.  2. Normal appendix.  3. No other significant findings.  History reviewed. No pertinent past medical history.    Review of patient's allergies indicates:  No Known Allergies      Current Outpatient Medications:     ascorbic acid, vitamin C, (VITAMIN C) 1000 MG tablet, Take 1,000 mg by mouth once daily., Disp: , Rfl:     co-enzyme Q-10 50 mg capsule, Take 100 mg by mouth once daily., Disp: , Rfl:     ergocalciferol (ERGOCALCIFEROL) 50,000 unit Cap, Take 50,000 Units by mouth every 7 days., Disp: , Rfl:     estradioL (ESTRACE) 1 MG tablet, Take 1 tablet (1 mg total) by mouth once daily., Disp: 90 tablet, Rfl: 2    niacin 500 MG Tab, Take 500 mg by mouth every evening., Disp: , Rfl:     zinc gluconate 50 mg tablet, Take 50 mg by mouth once daily., Disp: , Rfl:     methylsulfonylmethane (MSM) 1,000 mg Cap, Take by mouth once daily. 3000 mg, Disp: , Rfl:     Review of Systems   Constitutional:  Negative for unexpected weight change.   HENT:  Negative for trouble swallowing.    Eyes:  Negative for visual disturbance.   Respiratory:  Negative for shortness of breath.    Cardiovascular:  Negative for chest  "pain, palpitations and leg swelling.   Gastrointestinal:  Negative for blood in stool.   Genitourinary:  Negative for hematuria.   Skin:  Negative for rash.   Allergic/Immunologic: Negative for immunocompromised state.   Neurological:  Negative for headaches.   Hematological:  Does not bruise/bleed easily.   Psychiatric/Behavioral:  Negative for agitation. The patient is not nervous/anxious.      Objective:      /68 (BP Location: Left arm, Patient Position: Sitting, BP Method: Small (Manual))   Pulse 80   Temp 98.7 °F (37.1 °C) (Oral)   Ht 5' 7" (1.702 m)   Wt 67.2 kg (148 lb 2.4 oz)   SpO2 97%   BMI 23.20 kg/m²   Physical Exam  Constitutional:       Appearance: She is well-developed.   Eyes:      Pupils: Pupils are equal, round, and reactive to light.   Cardiovascular:      Rate and Rhythm: Normal rate and regular rhythm.      Heart sounds: Normal heart sounds.   Pulmonary:      Effort: Pulmonary effort is normal.      Breath sounds: Normal breath sounds.   Abdominal:      General: Bowel sounds are normal.      Palpations: Abdomen is soft.   Musculoskeletal:         General: Normal range of motion.      Cervical back: Normal range of motion.   Skin:     General: Skin is warm and dry.   Neurological:      Mental Status: She is alert and oriented to person, place, and time.   Psychiatric:         Behavior: Behavior normal.         Thought Content: Thought content normal.         Judgment: Judgment normal.       Assessment:       1. COVID-19 virus infection    2. Cyst of ovary, unspecified laterality    3. Pure hypercholesterolemia    4. Hormone replacement therapy (HRT)    5. BMI 23.0-23.9, adult        Plan:       COVID-19 virus infection  Stable, continue management  Cyst of ovary, unspecified laterality  -     US Pelvis Comp with Transvag NON-OB (xpd); Future; Expected date: 12/28/2022    Pure hypercholesterolemia  Stable, on Niacin  Hormone replacement therapy (HRT)  Stable, on estradiol  BMI " 23.0-23.9, adult  Stable, continue management    Time spent with patient: 20 minutes    Patient with be reevaluated in 4 weeks or sooner prn    Greater than 50% of this visit was spent counseling as described in above documentation:Yes

## 2023-01-05 ENCOUNTER — TELEPHONE (OUTPATIENT)
Dept: FAMILY MEDICINE | Facility: CLINIC | Age: 58
End: 2023-01-05
Payer: COMMERCIAL

## 2023-01-05 NOTE — TELEPHONE ENCOUNTER
----- Message from Ally Clayton sent at 1/5/2023 12:04 PM CST -----  Contact: PT  Type:  Needs Medical Advice    Who Called: PT   Symptoms (please be specific): STILL HAVING STOMACH PAINS    How long has patient had these symptoms:  SINCE APPT   Pharmacy name and phone #:      [id number] - [place] - [address] [facility]  [address] [place] [address]  Phone: [phone] Fax: [phone]      Would the patient rather a call back or a response via [facility]? CALL   Best Call Back Number: [phone] (home) PLEASE LEAVE A MESSAGE IF NO ANSWER     Additional Information: THANK YOU

## 2023-01-10 ENCOUNTER — HOSPITAL ENCOUNTER (OUTPATIENT)
Dept: RADIOLOGY | Facility: HOSPITAL | Age: 58
Discharge: HOME | End: 2023-01-10
Attending: NURSE PRACTITIONER
Payer: COMMERCIAL

## 2023-01-10 DIAGNOSIS — N83.209 CYST OF OVARY, UNSPECIFIED LATERALITY: ICD-10-CM

## 2023-01-10 PROCEDURE — 76830 TRANSVAGINAL US NON-OB: CPT | Mod: TC,PO

## 2023-01-11 ENCOUNTER — PATIENT MESSAGE (OUTPATIENT)
Dept: FAMILY MEDICINE | Facility: CLINIC | Age: 58
End: 2023-01-11
Payer: COMMERCIAL

## 2023-01-11 DIAGNOSIS — N83.209 CYST OF OVARY, UNSPECIFIED LATERALITY: Primary | ICD-10-CM

## 2023-01-12 ENCOUNTER — PATIENT MESSAGE (OUTPATIENT)
Dept: FAMILY MEDICINE | Facility: CLINIC | Age: 58
End: 2023-01-12
Payer: COMMERCIAL

## 2023-01-12 ENCOUNTER — TELEPHONE (OUTPATIENT)
Dept: FAMILY MEDICINE | Facility: CLINIC | Age: 58
End: 2023-01-12
Payer: COMMERCIAL

## 2023-01-12 NOTE — PROGRESS NOTES
Informed patient of US results patient all understanding. Patient wants to know if you can give her a call if you can.

## 2023-01-13 ENCOUNTER — TELEPHONE (OUTPATIENT)
Dept: FAMILY MEDICINE | Facility: CLINIC | Age: 58
End: 2023-01-13
Payer: COMMERCIAL

## 2023-01-25 ENCOUNTER — OFFICE VISIT (OUTPATIENT)
Dept: OBSTETRICS AND GYNECOLOGY | Facility: CLINIC | Age: 58
End: 2023-01-25
Payer: COMMERCIAL

## 2023-01-25 VITALS
WEIGHT: 152.56 LBS | DIASTOLIC BLOOD PRESSURE: 64 MMHG | RESPIRATION RATE: 16 BRPM | BODY MASS INDEX: 23.94 KG/M2 | SYSTOLIC BLOOD PRESSURE: 140 MMHG | HEIGHT: 67 IN

## 2023-01-25 DIAGNOSIS — N95.1 MENOPAUSAL SYMPTOMS: ICD-10-CM

## 2023-01-25 DIAGNOSIS — N83.209 CYST OF OVARY, UNSPECIFIED LATERALITY: Primary | ICD-10-CM

## 2023-01-25 PROCEDURE — 99999 PR PBB SHADOW E&M-EST. PATIENT-LVL IV: ICD-10-PCS | Mod: PBBFAC,,, | Performed by: OBSTETRICS & GYNECOLOGY

## 2023-01-25 PROCEDURE — 99999 PR PBB SHADOW E&M-EST. PATIENT-LVL IV: CPT | Mod: PBBFAC,,, | Performed by: OBSTETRICS & GYNECOLOGY

## 2023-01-25 PROCEDURE — 99204 OFFICE O/P NEW MOD 45 MIN: CPT | Mod: S$GLB,,, | Performed by: OBSTETRICS & GYNECOLOGY

## 2023-01-25 PROCEDURE — 3008F BODY MASS INDEX DOCD: CPT | Mod: CPTII,S$GLB,, | Performed by: OBSTETRICS & GYNECOLOGY

## 2023-01-25 PROCEDURE — 3077F SYST BP >= 140 MM HG: CPT | Mod: CPTII,S$GLB,, | Performed by: OBSTETRICS & GYNECOLOGY

## 2023-01-25 PROCEDURE — 3078F DIAST BP <80 MM HG: CPT | Mod: CPTII,S$GLB,, | Performed by: OBSTETRICS & GYNECOLOGY

## 2023-01-25 PROCEDURE — 99204 PR OFFICE/OUTPT VISIT, NEW, LEVL IV, 45-59 MIN: ICD-10-PCS | Mod: S$GLB,,, | Performed by: OBSTETRICS & GYNECOLOGY

## 2023-01-25 PROCEDURE — 3077F PR MOST RECENT SYSTOLIC BLOOD PRESSURE >= 140 MM HG: ICD-10-PCS | Mod: CPTII,S$GLB,, | Performed by: OBSTETRICS & GYNECOLOGY

## 2023-01-25 PROCEDURE — 1159F PR MEDICATION LIST DOCUMENTED IN MEDICAL RECORD: ICD-10-PCS | Mod: CPTII,S$GLB,, | Performed by: OBSTETRICS & GYNECOLOGY

## 2023-01-25 PROCEDURE — 1159F MED LIST DOCD IN RCRD: CPT | Mod: CPTII,S$GLB,, | Performed by: OBSTETRICS & GYNECOLOGY

## 2023-01-25 PROCEDURE — 3008F PR BODY MASS INDEX (BMI) DOCUMENTED: ICD-10-PCS | Mod: CPTII,S$GLB,, | Performed by: OBSTETRICS & GYNECOLOGY

## 2023-01-25 PROCEDURE — 3078F PR MOST RECENT DIASTOLIC BLOOD PRESSURE < 80 MM HG: ICD-10-PCS | Mod: CPTII,S$GLB,, | Performed by: OBSTETRICS & GYNECOLOGY

## 2023-01-25 NOTE — PROGRESS NOTES
"  Subjective:   Chief Complaint:  Gynecologic Exam       Patient ID: Rut Hurtado is a  57 y.o. female.    HRT:  Estradiol 1 mg    Date: 2023    The patient her  present today due to the following:  The patient noted episodes of abdominal pain associated with a COVID infection.  In light of this she underwent CT scan in 2022 which noted an adnexal abnormality.  The patient has a history of previous abdominal hysterectomy and was under the impression that 1 of her ovaries have been removed.    Subsequent follow-up ultrasound noted "bilateral adnexal cysts".  The patient denies any pelvic pain or vaginal bleeding    She would like to discuss her current hormone replacement her age and concerns.    GYN & OB History  No LMP recorded. Patient has had a hysterectomy.     Date of Last Pap: No result found    OB History    Para Term  AB Living   5 4 3 1 1 4   SAB IAB Ectopic Multiple Live Births   1       4      # Outcome Date GA Lbr Ron/2nd Weight Sex Delivery Anes PTL Lv   5 SAB            4             3 Term            2 Term            1 Term               Obstetric Comments   Vaginal delivery x 4   SAB x 1         History reviewed. No pertinent past medical history.     Past Surgical History:   Procedure Laterality Date    COLONOSCOPY N/A 2022    Procedure: COLONOSCOPY;  Surgeon: Tracy Calix MD;  Location: [facility];  Service: Endoscopy;  Laterality: N/A;    HYSTERECTOMY      JULIEN with removal "1 ovary"        Review of patient's allergies indicates:  No Known Allergies     Medications    Current Outpatient Medications:     ascorbic acid, vitamin C, (VITAMIN C) 1000 MG tablet, Take 1,000 mg by mouth once daily., Disp: , Rfl:     co-enzyme Q-10 50 mg capsule, Take 100 mg by mouth once daily., Disp: , Rfl:     ergocalciferol (ERGOCALCIFEROL) 50,000 unit Cap, Take 50,000 Units by mouth every 7 days., Disp: , Rfl:     estradioL (ESTRACE) 1 MG tablet, Take 1 " tablet (1 mg total) by mouth once daily., Disp: 90 tablet, Rfl: 2    methylsulfonylmethane (MSM) 1,000 mg Cap, Take by mouth once daily. 3000 mg, Disp: , Rfl:     niacin 500 MG Tab, Take 500 mg by mouth every evening., Disp: , Rfl:     zinc gluconate 50 mg tablet, Take 50 mg by mouth once daily., Disp: , Rfl:        Review of Systems (at today's evaluation)  Review of Systems   Constitutional:  Negative for fever and unexpected weight change.   HENT: Negative.     Respiratory:  Negative for cough and shortness of breath.    Cardiovascular:  Negative for chest pain.   Gastrointestinal:  Negative for abdominal pain, nausea and vomiting.   Genitourinary:  Negative for dysuria and urgency.          Gyn as per HPI   Musculoskeletal:  Negative for myalgias.   Integumentary:  Negative for rash.   Neurological: Negative.  Negative for headaches.   Breast: negative.       Objective:      Vitals:    01/25/23 1316   BP: (!) 140/64   Resp: 16     Physical Exam:   Constitutional: She appears well-developed and well-nourished.    HENT:   Head: Normocephalic.     Neck: No thyroid mass present.    Cardiovascular:  Normal rate, regular rhythm and normal heart sounds.             Pulmonary/Chest: Effort normal and breath sounds normal.        Abdominal: Soft. There is no abdominal tenderness.     Genitourinary:    Inguinal canal, vagina, right adnexa and left adnexa normal.      Pelvic exam was performed with patient supine.   The external female genitalia was normal.   No external genitalia lesions identified,Right adnexum displays no tenderness and no fullness. Left adnexum displays no tenderness and no fullness. No tenderness or bleeding in the vagina. Cervix is absent.Uterus is absent. Normal urethral meatus.Urethra findings: no tendernessBladder findings: no bladder tenderness          Musculoskeletal:      Right lower leg: No edema.      Left lower leg: No edema.      Lymphadenopathy:     She has no cervical adenopathy. No  inguinal adenopathy noted on the right or left side.    Neurological: She is alert.    Skin: Skin is warm and dry.    Psychiatric: Mood normal.       12/18/2022      Narrative & Impression  CT ABDOMEN AND PELVIS WITH CONTRAST     HISTORY: Abdominal pain     FINDINGS:     The lung bases are unremarkable.     The liver has a normal appearance. The gallbladder and biliary tree are within normal limits. The spleen is unremarkable. The pancreas, kidneys, and adrenal glands are normal. The abdominal aorta is normal in caliber.     There is no pathologic bowel wall thickening or evidence of obstruction. No free air or free fluid is identified. The appendix is normal.     Images of the pelvis demonstrate multiple cysts in both ovaries. The left ovary is enlarged as a result, measuring 7.3 cm in greatest transverse diameter. A dominant cyst on the right measures 4.2 cm, with smaller adjacent cysts noted. Similar findings to a lesser extent are demonstrated on a prior study from 2014.     No acute osseous abnormality is identified.     IMPRESSION:     1. Multicystic and enlarged appearance of both ovaries, mildly increased compared to 2014. Follow-up ultrasound should be considered.  2. Normal appendix.  3. No other significant findings.    1/11/2023      Narrative & Impression  Pelvic ultrasound transvaginal     CLINICAL DATA: Pelvic cystic masses     FINDINGS: Sonographic assessment of the pelvis was performed utilizing transvaginal technique. Comparison is made to prior CT studies from 2014 and December 2022.     Well marginated cystic bilateral adnexal masses are noted. On the right, cystic focus along the pelvic sidewall measures up to 5.2 cm in greatest dimension, with thin linear internal hyperechoic components but no focal masslike feature. On the left, similar appearing cystic well marginated structure measures 4.6 cm in greatest dimension. No significant focal solid component is identified. These have been present  on prior CT studies dating back to 2014 but have shown gradual interval enlargement. These are favored to reflect ovarian cysts with thin internal septations. Bilateral hydrosalpinx could also give this appearance but is felt less likely.     The uterus is surgically absent.     IMPRESSION:  1. Bilateral adnexal cystic structures likely reflect grouped or septated ovarian cysts. Bilateral hydrosalpinx could conceivably give this appearance, but is felt less likely.  2. Surgical absence of the uterus.     Assessment:        1. Cyst of ovary, unspecified laterality    2. Menopausal symptoms         Plan:      Cyst of ovary, unspecified laterality  Comments:  Bilateral  Orders:  -     Ambulatory referral/consult to Obstetrics / Gynecology  -     Risk of Ovarian Malignancy Algorithm, CORY; Future; Expected date: 01/25/2023    Menopausal symptoms       Follow up in about 2 weeks (around 2/8/2023) for Follow-up on today's evaluation.        The above was reviewed with the patient her .    We discussed the fact that radiologic studies appear to note bilateral adnexal abnormalities.  The possibility of ovarian remnant was discussed.      We discussed the important issue regarding the fact that when compared to previous CT scan from 2014 only a small amount of change was noted.  Potential benign premalignant and malignant lesions were discussed though malignancy is of low likelihood secondary to the longstanding nature    Options reviewed and at this time will proceed as follows:  A [place] test was obtained to evaluate for underlying malignancy risk  The patient her  will consider the options of conservative management versus surgical intervention and we will discuss at follow-up evaluation based on the results of blood test    In regards to hormone replacement therapy the patient will attempt to lessen her dosage to 0.5 mg daily and we will re-evaluate based on her menopausal symptoms.    The patient her  's questions regarding the above were answered and they are in agreement with this plan

## 2023-01-26 ENCOUNTER — LAB VISIT (OUTPATIENT)
Dept: LAB | Facility: HOSPITAL | Age: 58
End: 2023-01-26
Attending: OBSTETRICS & GYNECOLOGY
Payer: COMMERCIAL

## 2023-01-26 DIAGNOSIS — N83.209 CYST OF OVARY, UNSPECIFIED LATERALITY: ICD-10-CM

## 2023-01-26 PROCEDURE — 86304 IMMUNOASSAY TUMOR CA 125: CPT | Performed by: OBSTETRICS & GYNECOLOGY

## 2023-01-26 PROCEDURE — 86305 HUMAN EPIDIDYMIS PROTEIN 4: CPT | Performed by: OBSTETRICS & GYNECOLOGY

## 2023-01-26 PROCEDURE — 36415 COLL VENOUS BLD VENIPUNCTURE: CPT | Performed by: OBSTETRICS & GYNECOLOGY

## 2023-01-27 LAB
CANCER AG125 SERPL IA-ACNC: 6 U/ML
HE4 SERPL-SCNC: 48 PMOL/L
ROMA SCORE PREMEN SERPL: 0.65
ROMA, POSTMENOPAUSAL: 0.6

## 2023-02-02 ENCOUNTER — TELEPHONE (OUTPATIENT)
Dept: OBSTETRICS AND GYNECOLOGY | Facility: CLINIC | Age: 58
End: 2023-02-02
Payer: COMMERCIAL

## 2023-02-02 NOTE — TELEPHONE ENCOUNTER
----- Message from Vladimir Justice MD sent at 2/2/2023  9:26 AM CST -----  Please make sure this patient has an appointment scheduled following her ultrasound.

## 2023-02-08 ENCOUNTER — OFFICE VISIT (OUTPATIENT)
Dept: OBSTETRICS AND GYNECOLOGY | Facility: CLINIC | Age: 58
End: 2023-02-08
Payer: COMMERCIAL

## 2023-02-08 VITALS
HEIGHT: 67 IN | BODY MASS INDEX: 23.94 KG/M2 | WEIGHT: 152.56 LBS | RESPIRATION RATE: 16 BRPM | SYSTOLIC BLOOD PRESSURE: 122 MMHG | DIASTOLIC BLOOD PRESSURE: 76 MMHG

## 2023-02-08 DIAGNOSIS — N95.1 MENOPAUSAL SYMPTOMS: ICD-10-CM

## 2023-02-08 DIAGNOSIS — N83.209 CYST OF OVARY, UNSPECIFIED LATERALITY: Primary | ICD-10-CM

## 2023-02-08 PROCEDURE — 1159F MED LIST DOCD IN RCRD: CPT | Mod: CPTII,S$GLB,, | Performed by: OBSTETRICS & GYNECOLOGY

## 2023-02-08 PROCEDURE — 3008F PR BODY MASS INDEX (BMI) DOCUMENTED: ICD-10-PCS | Mod: CPTII,S$GLB,, | Performed by: OBSTETRICS & GYNECOLOGY

## 2023-02-08 PROCEDURE — 3074F PR MOST RECENT SYSTOLIC BLOOD PRESSURE < 130 MM HG: ICD-10-PCS | Mod: CPTII,S$GLB,, | Performed by: OBSTETRICS & GYNECOLOGY

## 2023-02-08 PROCEDURE — 3008F BODY MASS INDEX DOCD: CPT | Mod: CPTII,S$GLB,, | Performed by: OBSTETRICS & GYNECOLOGY

## 2023-02-08 PROCEDURE — 3074F SYST BP LT 130 MM HG: CPT | Mod: CPTII,S$GLB,, | Performed by: OBSTETRICS & GYNECOLOGY

## 2023-02-08 PROCEDURE — 99999 PR PBB SHADOW E&M-EST. PATIENT-LVL III: CPT | Mod: PBBFAC,,, | Performed by: OBSTETRICS & GYNECOLOGY

## 2023-02-08 PROCEDURE — 1159F PR MEDICATION LIST DOCUMENTED IN MEDICAL RECORD: ICD-10-PCS | Mod: CPTII,S$GLB,, | Performed by: OBSTETRICS & GYNECOLOGY

## 2023-02-08 PROCEDURE — 3078F DIAST BP <80 MM HG: CPT | Mod: CPTII,S$GLB,, | Performed by: OBSTETRICS & GYNECOLOGY

## 2023-02-08 PROCEDURE — 99213 PR OFFICE/OUTPT VISIT, EST, LEVL III, 20-29 MIN: ICD-10-PCS | Mod: S$GLB,,, | Performed by: OBSTETRICS & GYNECOLOGY

## 2023-02-08 PROCEDURE — 99213 OFFICE O/P EST LOW 20 MIN: CPT | Mod: S$GLB,,, | Performed by: OBSTETRICS & GYNECOLOGY

## 2023-02-08 PROCEDURE — 3078F PR MOST RECENT DIASTOLIC BLOOD PRESSURE < 80 MM HG: ICD-10-PCS | Mod: CPTII,S$GLB,, | Performed by: OBSTETRICS & GYNECOLOGY

## 2023-02-08 PROCEDURE — 99999 PR PBB SHADOW E&M-EST. PATIENT-LVL III: ICD-10-PCS | Mod: PBBFAC,,, | Performed by: OBSTETRICS & GYNECOLOGY

## 2023-02-08 NOTE — PROGRESS NOTES
"  Subjective:   Chief Complaint:  Follow-up ([facility] follow up)       Patient ID: Rut Hurtado is a  57 y.o. female.    HRT:  Estradiol 1 mg    Date: 2023    The patient her  present today due to the following:  The patient noted episodes of abdominal pain associated with a COVID infection.  In light of this she underwent CT scan in 2022 which noted an adnexal abnormality.  The patient has a history of previous abdominal hysterectomy and was under the impression that 1 of her ovaries have been removed.    Subsequent follow-up ultrasound noted "bilateral adnexal cysts".  The patient denies any pelvic pain or vaginal bleeding    She would like to discuss her current hormone replacement her age and concerns.    Date: 2023    The patient presents today for follow-up.  She was last seen as above.  In light of the above tumor markers/a [place] test was obtained and the patient and her  present today to discuss the results.    GYN & OB History  No LMP recorded. Patient has had a hysterectomy.     Date of Last Pap: No result found    OB History    Para Term  AB Living   5 4 3 1 1 4   SAB IAB Ectopic Multiple Live Births   1       4      # Outcome Date GA Lbr Ron/2nd Weight Sex Delivery Anes PTL Lv   5 SAB            4             3 Term            2 Term            1 Term               Obstetric Comments   Vaginal delivery x 4   SAB x 1         History reviewed. No pertinent past medical history.     Past Surgical History:   Procedure Laterality Date    COLONOSCOPY N/A 2022    Procedure: COLONOSCOPY;  Surgeon: Tracy Calix MD;  Location: [facility];  Service: Endoscopy;  Laterality: N/A;    HYSTERECTOMY      JULIEN with removal "1 ovary"        Review of patient's allergies indicates:  No Known Allergies     Medications    Current Outpatient Medications:     ascorbic acid, vitamin C, (VITAMIN C) 1000 MG tablet, Take 1,000 mg by mouth once daily., Disp: , Rfl: "     co-enzyme Q-10 50 mg capsule, Take 100 mg by mouth once daily., Disp: , Rfl:     ergocalciferol (ERGOCALCIFEROL) 50,000 unit Cap, Take 50,000 Units by mouth every 7 days., Disp: , Rfl:     estradioL (ESTRACE) 1 MG tablet, Take 1 tablet (1 mg total) by mouth once daily., Disp: 90 tablet, Rfl: 2    methylsulfonylmethane (MSM) 1,000 mg Cap, Take by mouth once daily. 3000 mg, Disp: , Rfl:     niacin 500 MG Tab, Take 500 mg by mouth every evening., Disp: , Rfl:     zinc gluconate 50 mg tablet, Take 50 mg by mouth once daily., Disp: , Rfl:        Review of Systems (at today's evaluation)  Review of Systems   Constitutional:  Negative for fever and unexpected weight change.   Respiratory: Negative.     Cardiovascular:  Negative for chest pain and palpitations.   Gastrointestinal:  Negative for nausea and vomiting.   Genitourinary:  Negative for dysuria, hematuria and urgency.   Neurological:  Negative for headaches.      Objective:      Vitals:    02/08/23 1036   BP: 122/76   Resp: 16     Physical Exam:   Constitutional: She appears well-developed and well-nourished.    HENT:   Head: Normocephalic.     Neck: No thyroid mass present.    Cardiovascular:  Normal rate.             Pulmonary/Chest: Effort normal. No respiratory distress.        Abdominal: Soft. There is no abdominal tenderness.             Musculoskeletal: Normal range of motion.      Right lower leg: No edema.      Left lower leg: No edema.       Neurological: She is alert.   No gross lesions noted.    Skin: Skin is warm and dry.    Psychiatric: She has a normal mood and affect. Her speech is normal and behavior is normal. Mood normal.         Recent Laboratory Results:    01/26/2023: [place] blood test was 0.60.  Postmenopausal greater than 2.99 indicates high-risk of finding epithelial ovarian cancer.    Recent Radiology Results:    12/18/2022      Narrative & Impression  CT ABDOMEN AND PELVIS WITH CONTRAST     HISTORY: Abdominal pain     FINDINGS:     The  lung bases are unremarkable.     The liver has a normal appearance. The gallbladder and biliary tree are within normal limits. The spleen is unremarkable. The pancreas, kidneys, and adrenal glands are normal. The abdominal aorta is normal in caliber.     There is no pathologic bowel wall thickening or evidence of obstruction. No free air or free fluid is identified. The appendix is normal.     Images of the pelvis demonstrate multiple cysts in both ovaries. The left ovary is enlarged as a result, measuring 7.3 cm in greatest transverse diameter. A dominant cyst on the right measures 4.2 cm, with smaller adjacent cysts noted. Similar findings to a lesser extent are demonstrated on a prior study from 2014.     No acute osseous abnormality is identified.     IMPRESSION:     1. Multicystic and enlarged appearance of both ovaries, mildly increased compared to 2014. Follow-up ultrasound should be considered.  2. Normal appendix.  3. No other significant findings.    1/11/2023      Narrative & Impression  Pelvic ultrasound transvaginal     CLINICAL DATA: Pelvic cystic masses     FINDINGS: Sonographic assessment of the pelvis was performed utilizing transvaginal technique. Comparison is made to prior CT studies from 2014 and December 2022.     Well marginated cystic bilateral adnexal masses are noted. On the right, cystic focus along the pelvic sidewall measures up to 5.2 cm in greatest dimension, with thin linear internal hyperechoic components but no focal masslike feature. On the left, similar appearing cystic well marginated structure measures 4.6 cm in greatest dimension. No significant focal solid component is identified. These have been present on prior CT studies dating back to 2014 but have shown gradual interval enlargement. These are favored to reflect ovarian cysts with thin internal septations. Bilateral hydrosalpinx could also give this appearance but is felt less likely.     The uterus is surgically absent.      IMPRESSION:  1. Bilateral adnexal cystic structures likely reflect grouped or septated ovarian cysts. Bilateral hydrosalpinx could conceivably give this appearance, but is felt less likely.  2. Surgical absence of the uterus.         Assessment:        1. Cyst of ovary, unspecified laterality    2. Menopausal symptoms           Plan:      Cyst of ovary, unspecified laterality    Menopausal symptoms      Follow up in about 8 months (around 10/8/2023) for as needed or for annual exam.        The above was reviewed with the patient her .    We once again discussed the previous radiologic findings as well as the reassuring findings on the [place] blood test.  The patient and her 's questions regarding the above were answered and they reassured by the laboratory results and would prefer to be followed from a conservative standpoint.    She will follow-up as needed or for annual exam.    The patient her 's questions regarding the above were answered and they are in agreement with this plan.

## 2023-09-15 ENCOUNTER — OFFICE VISIT (OUTPATIENT)
Dept: FAMILY MEDICINE | Facility: CLINIC | Age: 58
End: 2023-09-15
Payer: COMMERCIAL

## 2023-09-15 VITALS
BODY MASS INDEX: 24.26 KG/M2 | HEART RATE: 78 BPM | OXYGEN SATURATION: 98 % | WEIGHT: 154.56 LBS | HEIGHT: 67 IN | SYSTOLIC BLOOD PRESSURE: 120 MMHG | DIASTOLIC BLOOD PRESSURE: 78 MMHG

## 2023-09-15 DIAGNOSIS — Z00.00 ROUTINE GENERAL MEDICAL EXAMINATION AT A HEALTH CARE FACILITY: Primary | ICD-10-CM

## 2023-09-15 DIAGNOSIS — Z12.31 ENCOUNTER FOR SCREENING MAMMOGRAM FOR MALIGNANT NEOPLASM OF BREAST: ICD-10-CM

## 2023-09-15 DIAGNOSIS — E78.5 HYPERLIPIDEMIA, UNSPECIFIED HYPERLIPIDEMIA TYPE: ICD-10-CM

## 2023-09-15 PROCEDURE — 3008F BODY MASS INDEX DOCD: CPT | Mod: CPTII,S$GLB,, | Performed by: STUDENT IN AN ORGANIZED HEALTH CARE EDUCATION/TRAINING PROGRAM

## 2023-09-15 PROCEDURE — 99396 PREV VISIT EST AGE 40-64: CPT | Mod: S$GLB,,, | Performed by: STUDENT IN AN ORGANIZED HEALTH CARE EDUCATION/TRAINING PROGRAM

## 2023-09-15 PROCEDURE — 1159F MED LIST DOCD IN RCRD: CPT | Mod: CPTII,S$GLB,, | Performed by: STUDENT IN AN ORGANIZED HEALTH CARE EDUCATION/TRAINING PROGRAM

## 2023-09-15 PROCEDURE — 99999 PR PBB SHADOW E&M-EST. PATIENT-LVL IV: ICD-10-PCS | Mod: PBBFAC,,, | Performed by: STUDENT IN AN ORGANIZED HEALTH CARE EDUCATION/TRAINING PROGRAM

## 2023-09-15 PROCEDURE — 3008F PR BODY MASS INDEX (BMI) DOCUMENTED: ICD-10-PCS | Mod: CPTII,S$GLB,, | Performed by: STUDENT IN AN ORGANIZED HEALTH CARE EDUCATION/TRAINING PROGRAM

## 2023-09-15 PROCEDURE — 99396 PR PREVENTIVE VISIT,EST,40-64: ICD-10-PCS | Mod: S$GLB,,, | Performed by: STUDENT IN AN ORGANIZED HEALTH CARE EDUCATION/TRAINING PROGRAM

## 2023-09-15 PROCEDURE — 1160F PR REVIEW ALL MEDS BY PRESCRIBER/CLIN PHARMACIST DOCUMENTED: ICD-10-PCS | Mod: CPTII,S$GLB,, | Performed by: STUDENT IN AN ORGANIZED HEALTH CARE EDUCATION/TRAINING PROGRAM

## 2023-09-15 PROCEDURE — 3074F SYST BP LT 130 MM HG: CPT | Mod: CPTII,S$GLB,, | Performed by: STUDENT IN AN ORGANIZED HEALTH CARE EDUCATION/TRAINING PROGRAM

## 2023-09-15 PROCEDURE — 1159F PR MEDICATION LIST DOCUMENTED IN MEDICAL RECORD: ICD-10-PCS | Mod: CPTII,S$GLB,, | Performed by: STUDENT IN AN ORGANIZED HEALTH CARE EDUCATION/TRAINING PROGRAM

## 2023-09-15 PROCEDURE — 3078F DIAST BP <80 MM HG: CPT | Mod: CPTII,S$GLB,, | Performed by: STUDENT IN AN ORGANIZED HEALTH CARE EDUCATION/TRAINING PROGRAM

## 2023-09-15 PROCEDURE — 3078F PR MOST RECENT DIASTOLIC BLOOD PRESSURE < 80 MM HG: ICD-10-PCS | Mod: CPTII,S$GLB,, | Performed by: STUDENT IN AN ORGANIZED HEALTH CARE EDUCATION/TRAINING PROGRAM

## 2023-09-15 PROCEDURE — 3074F PR MOST RECENT SYSTOLIC BLOOD PRESSURE < 130 MM HG: ICD-10-PCS | Mod: CPTII,S$GLB,, | Performed by: STUDENT IN AN ORGANIZED HEALTH CARE EDUCATION/TRAINING PROGRAM

## 2023-09-15 PROCEDURE — 1160F RVW MEDS BY RX/DR IN RCRD: CPT | Mod: CPTII,S$GLB,, | Performed by: STUDENT IN AN ORGANIZED HEALTH CARE EDUCATION/TRAINING PROGRAM

## 2023-09-15 PROCEDURE — 99999 PR PBB SHADOW E&M-EST. PATIENT-LVL IV: CPT | Mod: PBBFAC,,, | Performed by: STUDENT IN AN ORGANIZED HEALTH CARE EDUCATION/TRAINING PROGRAM

## 2023-09-15 NOTE — PATIENT INSTRUCTIONS
I recommend a heart healthy diet rich in fiber, fresh vegetables and fruit and low in saturated fats (fried foods, red meat, etc.).  The Mediterranean diet is probably the best for your health with a plant based diet high in nuts, beans, olive oil and a few servings a week of fish. You do not need to eat a meat at every meal to be healthy!      Fiber might also be helpful like daily benefiber or metamucil.         Cholesterol medicine I would recommend is Crestor (rosuvastatin).   Let me know if interested.         Kevin Bettencourt,     If you are due for any health screening(s) below please notify me so we can arrange them to be ordered and scheduled to maintain your health. Most healthy patients complete it. Don't lose out on improving your health.     All of your core healthy metrics are met.

## 2023-09-15 NOTE — PROGRESS NOTES
"[facility] MEDICINE CLINIC NOTE    Patient Name: Rut Hurtado  YOB: 1965    PRESENTING HISTORY     History of Present Illness:  Ms. Rut Hurtado is a 57 y.o. female here for routine physical.     PMHx:   Surg: hysterectomy, breast biopsy/lumpectomy for noncancerous lesion.   Fhx: mother liver/colon cancer. Father lung cancer.   Shed is on 5 year colonoscopy [place].   Social: walks for exercise 1 hour. No CP or dyspnea.    Non tobacoo. No EtOH.     HLD- tried zetia in past. Had muscle cramps.     Had Coronary Calcium Score with previous PCP. Unsure what it was but non-zero.     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 No blood or blood products.         ROS      OBJECTIVE:   Vital Signs:  Vitals:    09/15/23 1308   BP: 120/78   Pulse: 78   SpO2: 98%   Weight: 70.1 kg (154 lb 8.7 oz)   Height: 5' 7" (1.702 m)            Physical Exam  Vitals and nursing note reviewed.   Constitutional:       Appearance: She is not ill-appearing, toxic-appearing or diaphoretic.   HENT:      Head: Normocephalic and atraumatic.      Right Ear: External ear normal.      Left Ear: External ear normal.   Eyes:      General: No scleral icterus.     Conjunctiva/sclera: Conjunctivae normal.      Pupils: Pupils are equal, round, and reactive to light.   Neck:      Thyroid: No thyromegaly.      Vascular: No carotid bruit.   Cardiovascular:      Rate and Rhythm: Normal rate and regular rhythm.      Heart sounds: Normal heart sounds. No murmur heard.  Pulmonary:      Effort: Pulmonary effort is normal. No respiratory distress.      Breath sounds: Normal breath sounds. No wheezing or rales.   Musculoskeletal:         General: No tenderness or deformity. Normal range of motion.      Cervical back: Normal range of motion and neck supple.      Right lower leg: No edema.      Left lower leg: No edema.   Lymphadenopathy:      Cervical: No cervical adenopathy.   Skin:     General: Skin is warm and dry.      Findings: No erythema or rash. "   Neurological:      Mental Status: She is alert and oriented to person, place, and time.      Gait: Gait is intact.   Psychiatric:         Mood and Affect: Mood and affect normal.         Cognition and Memory: Memory normal.         Judgment: Judgment normal.         ASSESSMENT & PLAN:     Routine general medical examination at a health care facility  -     Lipid Panel; Future; Expected date: 09/15/2023  -     Hemoglobin A1C; Future; Expected date: 09/15/2023  -     Comprehensive Metabolic Panel; Future; Expected date: 09/15/2023    Hyperlipidemia, unspecified hyperlipidemia type  I recommend a heart healthy diet rich in fiber, fresh vegetables and fruit and low in saturated fats (fried foods, red meat, etc.).  The Mediterranean diet is probably the best for your health with a plant based diet high in nuts, beans, olive oil and a few servings a week of fish. You do not need to eat a meat at every meal to be healthy!      Fiber might also be helpful like daily benefiber or metamucil.         Cholesterol medicine I would recommend is Crestor (rosuvastatin).   Let me know if interested.     Encounter for screening mammogram for malignant neoplasm of breast  -     Mammo Digital Screening [facility]/ [facility]; Future; Expected date: 09/15/2023             Ernesto Woody MD   Internal Medicine

## 2023-09-18 ENCOUNTER — LAB VISIT (OUTPATIENT)
Dept: LAB | Facility: HOSPITAL | Age: 58
End: 2023-09-18
Attending: STUDENT IN AN ORGANIZED HEALTH CARE EDUCATION/TRAINING PROGRAM
Payer: COMMERCIAL

## 2023-09-18 DIAGNOSIS — Z00.00 ROUTINE GENERAL MEDICAL EXAMINATION AT A HEALTH CARE FACILITY: ICD-10-CM

## 2023-09-18 LAB
ALBUMIN SERPL BCP-MCNC: 3.9 G/DL (ref 3.5–5.2)
ALP SERPL-CCNC: 76 U/L (ref 55–135)
ALT SERPL W/O P-5'-P-CCNC: 12 U/L (ref 10–44)
ANION GAP SERPL CALC-SCNC: 9 MMOL/L (ref 8–16)
AST SERPL-CCNC: 17 U/L (ref 10–40)
BILIRUB SERPL-MCNC: 0.4 MG/DL (ref 0.1–1)
BUN SERPL-MCNC: 6 MG/DL (ref 6–20)
CALCIUM SERPL-MCNC: 9.8 MG/DL (ref 8.7–10.5)
CHLORIDE SERPL-SCNC: 104 MMOL/L (ref 95–110)
CHOLEST SERPL-MCNC: 254 MG/DL (ref 120–199)
CHOLEST/HDLC SERPL: 4.5 {RATIO} (ref 2–5)
CO2 SERPL-SCNC: 25 MMOL/L (ref 23–29)
CREAT SERPL-MCNC: 0.9 MG/DL (ref 0.5–1.4)
EST. GFR  (NO RACE VARIABLE): >60 ML/MIN/1.73 M^2
ESTIMATED AVG GLUCOSE: 105 MG/DL (ref 68–131)
GLUCOSE SERPL-MCNC: 92 MG/DL (ref 70–110)
HBA1C MFR BLD: 5.3 % (ref 4–5.6)
HDLC SERPL-MCNC: 57 MG/DL (ref 40–75)
HDLC SERPL: 22.4 % (ref 20–50)
LDLC SERPL CALC-MCNC: 184.4 MG/DL (ref 63–159)
NONHDLC SERPL-MCNC: 197 MG/DL
POTASSIUM SERPL-SCNC: 3.6 MMOL/L (ref 3.5–5.1)
PROT SERPL-MCNC: 7.7 G/DL (ref 6–8.4)
SODIUM SERPL-SCNC: 138 MMOL/L (ref 136–145)
TRIGL SERPL-MCNC: 63 MG/DL (ref 30–150)

## 2023-09-18 PROCEDURE — 80053 COMPREHEN METABOLIC PANEL: CPT | Performed by: STUDENT IN AN ORGANIZED HEALTH CARE EDUCATION/TRAINING PROGRAM

## 2023-09-18 PROCEDURE — 83036 HEMOGLOBIN GLYCOSYLATED A1C: CPT | Performed by: STUDENT IN AN ORGANIZED HEALTH CARE EDUCATION/TRAINING PROGRAM

## 2023-09-18 PROCEDURE — 80061 LIPID PANEL: CPT | Performed by: STUDENT IN AN ORGANIZED HEALTH CARE EDUCATION/TRAINING PROGRAM

## 2023-09-18 PROCEDURE — 36415 COLL VENOUS BLD VENIPUNCTURE: CPT | Mod: PO | Performed by: STUDENT IN AN ORGANIZED HEALTH CARE EDUCATION/TRAINING PROGRAM

## 2023-09-29 ENCOUNTER — HOSPITAL ENCOUNTER (OUTPATIENT)
Dept: RADIOLOGY | Facility: HOSPITAL | Age: 58
Discharge: HOME | End: 2023-09-29
Attending: STUDENT IN AN ORGANIZED HEALTH CARE EDUCATION/TRAINING PROGRAM
Payer: COMMERCIAL

## 2023-09-29 VITALS — HEIGHT: 67 IN | BODY MASS INDEX: 24.26 KG/M2 | WEIGHT: 154.56 LBS

## 2023-09-29 DIAGNOSIS — Z12.31 ENCOUNTER FOR SCREENING MAMMOGRAM FOR MALIGNANT NEOPLASM OF BREAST: ICD-10-CM

## 2023-09-29 PROCEDURE — 77067 SCR MAMMO BI INCL CAD: CPT | Mod: TC,PO

## 2023-11-19 ENCOUNTER — PATIENT MESSAGE (OUTPATIENT)
Dept: OBSTETRICS AND GYNECOLOGY | Facility: CLINIC | Age: 58
End: 2023-11-19
Payer: COMMERCIAL

## 2023-11-20 RX ORDER — ESTRADIOL 1 MG/1
1 TABLET ORAL DAILY
Qty: 90 TABLET | Refills: 2 | Status: SHIPPED | OUTPATIENT
Start: 2023-11-20

## 2024-08-02 ENCOUNTER — OFFICE VISIT (OUTPATIENT)
Dept: URGENT CARE | Facility: CLINIC | Age: 59
End: 2024-08-02
Payer: COMMERCIAL

## 2024-08-02 VITALS
HEIGHT: 67 IN | SYSTOLIC BLOOD PRESSURE: 140 MMHG | DIASTOLIC BLOOD PRESSURE: 96 MMHG | BODY MASS INDEX: 24.17 KG/M2 | OXYGEN SATURATION: 98 % | RESPIRATION RATE: 18 BRPM | WEIGHT: 154 LBS | TEMPERATURE: 100 F | HEART RATE: 93 BPM

## 2024-08-02 DIAGNOSIS — R10.9 BILATERAL FLANK PAIN: ICD-10-CM

## 2024-08-02 DIAGNOSIS — R30.0 DYSURIA: Primary | ICD-10-CM

## 2024-08-02 DIAGNOSIS — U07.1 COVID-19 VIRUS INFECTION: ICD-10-CM

## 2024-08-02 DIAGNOSIS — R10.11 RIGHT UPPER QUADRANT PAIN: ICD-10-CM

## 2024-08-02 LAB
BILIRUB UR QL STRIP: NEGATIVE
CTP QC/QA: YES
GLUCOSE UR QL STRIP: NEGATIVE
KETONES UR QL STRIP: NEGATIVE
LEUKOCYTE ESTERASE UR QL STRIP: NEGATIVE
PH, POC UA: 6
POC BLOOD, URINE: NEGATIVE
POC NITRATES, URINE: NEGATIVE
PROT UR QL STRIP: NEGATIVE
SARS-COV-2 AG RESP QL IA.RAPID: POSITIVE
SP GR UR STRIP: 1.01 (ref 1–1.03)
UROBILINOGEN UR STRIP-ACNC: NORMAL (ref 0.1–1.1)

## 2024-08-02 RX ORDER — CEPHALEXIN 500 MG/1
500 CAPSULE ORAL EVERY 12 HOURS
Qty: 14 CAPSULE | Refills: 0 | Status: SHIPPED | OUTPATIENT
Start: 2024-08-02 | End: 2024-08-09

## 2024-08-02 RX ORDER — ONDANSETRON 4 MG/1
4 TABLET, ORALLY DISINTEGRATING ORAL EVERY 8 HOURS PRN
Qty: 20 TABLET | Refills: 0 | Status: SHIPPED | OUTPATIENT
Start: 2024-08-02

## 2024-08-02 NOTE — PROGRESS NOTES
"Subjective:      Patient ID: Rut Hurtado is a 58 y.o. female.    Vitals:  height is 5' 7" (1.702 m) and weight is 69.9 kg (154 lb). Her temperature is 100 °F (37.8 °C). Her blood pressure is 140/96 (abnormal) and her pulse is 93. Her respiration is 18 and oxygen saturation is 98%.     Chief Complaint: Urinary Tract Infection    Pt states she has covid and had covid in the last year and that it liver and gall bladder, she went to the ER for that, now with covid this time she feels like it is attacking her kidneys and she if not urinating enough.    Onset of symptoms early in the week Sunday/Monday.  Took a COVID home test on Monday was negative.  Continuing to feel bad so retested yesterday at home, COVID positive.  Now she is having some burning with urination and bilateral mid back pain concern for kidney infection and desires evaluation.  Complaining of backache both sides mid to low back, has not taking anything for fever/pain today.    Urinary Tract Infection   This is a new problem. The current episode started in the past 7 days. Associated symptoms include chills, flank pain, frequency, nausea and withholding. Pertinent negatives include no hematuria, vomiting or constipation. Associated symptoms comments: burning. Treatments tried: electrolites. The treatment provided no relief.       Constitution: Positive for chills, fatigue and fever.   HENT:  Positive for congestion. Negative for sore throat.    Respiratory:  Positive for cough. Negative for chest tightness, shortness of breath, wheezing and asthma.    Gastrointestinal:  Positive for abdominal pain and nausea. Negative for vomiting, constipation and diarrhea.   Genitourinary:  Positive for dysuria, frequency and flank pain. Negative for hematuria.   Musculoskeletal:  Positive for back pain.   Allergic/Immunologic: Negative for asthma.      Objective:     Physical Exam   Constitutional: She is oriented to person, place, and time. She is cooperative.  " Non-toxic appearance. She does not appear ill. No distress. awake  HENT:   Head: Normocephalic and atraumatic.   Ears:   Right Ear: Tympanic membrane, external ear and ear canal normal.   Left Ear: Tympanic membrane, external ear and ear canal normal.   Nose: Congestion present. No rhinorrhea.   Mouth/Throat: Mucous membranes are moist. No oropharyngeal exudate or posterior oropharyngeal erythema.   Eyes: Conjunctivae are normal. Right eye exhibits no discharge. Left eye exhibits no discharge.   Neck: Neck supple. No neck rigidity present.   Cardiovascular: Normal rate, regular rhythm and normal heart sounds.   Pulmonary/Chest: Effort normal and breath sounds normal. No accessory muscle usage. No tachypnea. No respiratory distress. She has no wheezes. She has no rhonchi. She has no rales. She exhibits no tenderness.   Abdominal: Normal appearance. She exhibits no distension. Soft. flat abdomen There is generalized abdominal tenderness and tenderness in the right upper quadrant. There is guarding, positive Osborne's sign, left CVA tenderness and right CVA tenderness. There is no rebound, no tenderness at McBurney's point and negative Rovsing's sign.   Musculoskeletal:      Cervical back: She exhibits tenderness.   Lymphadenopathy:     She has no cervical adenopathy.   Neurological: no focal deficit. She is alert and oriented to person, place, and time. No sensory deficit.   Skin: Skin is warm, dry, not diaphoretic and no rash. Capillary refill takes 2 to 3 seconds.   Psychiatric: Her behavior is normal. Mood normal.   Nursing note and vitals reviewed.      Assessment:     1. Dysuria    2. COVID-19 virus infection    3. Right upper quadrant pain    4. Bilateral flank pain      COVID positive  UA negative    Urine culture pending    Plan:       Dysuria  -     POCT Urinalysis, Dipstick, Manual, W/O Scope  -     Urine culture  -     cephALEXin (KEFLEX) 500 MG capsule; Take 1 capsule (500 mg total) by mouth every 12  (twelve) hours. for 7 days  Dispense: 14 capsule; Refill: 0    COVID-19 virus infection  -     SARS Coronavirus 2 Antigen, POCT Manual Read  -     ondansetron (ZOFRAN-ODT) 4 MG TbDL; Take 1 tablet (4 mg total) by mouth every 8 (eight) hours as needed (nausea).  Dispense: 20 tablet; Refill: 0    Right upper quadrant pain    Bilateral flank pain  -     cephALEXin (KEFLEX) 500 MG capsule; Take 1 capsule (500 mg total) by mouth every 12 (twelve) hours. for 7 days  Dispense: 14 capsule; Refill: 0                Advised to go to ER for further evaluation and management.  Current symptoms may be completely COVID related, UA negative, patient desires desires initiation with antibiotics for bladder infection pending urine culture results and will go to ER if symptoms worsen

## 2024-08-03 ENCOUNTER — HOSPITAL ENCOUNTER (EMERGENCY)
Facility: HOSPITAL | Age: 59
Discharge: HOME | End: 2024-08-03
Attending: EMERGENCY MEDICINE

## 2024-08-03 VITALS
OXYGEN SATURATION: 100 % | DIASTOLIC BLOOD PRESSURE: 80 MMHG | BODY MASS INDEX: 24.75 KG/M2 | HEART RATE: 63 BPM | HEIGHT: 66 IN | RESPIRATION RATE: 18 BRPM | SYSTOLIC BLOOD PRESSURE: 152 MMHG | TEMPERATURE: 98 F | WEIGHT: 154 LBS

## 2024-08-03 DIAGNOSIS — K29.70 GASTRITIS, PRESENCE OF BLEEDING UNSPECIFIED, UNSPECIFIED CHRONICITY, UNSPECIFIED GASTRITIS TYPE: ICD-10-CM

## 2024-08-03 DIAGNOSIS — U07.1 COVID: Primary | ICD-10-CM

## 2024-08-03 LAB
ALBUMIN SERPL BCP-MCNC: 3.9 G/DL (ref 3.5–5.2)
ALP SERPL-CCNC: 78 U/L (ref 55–135)
ALT SERPL W/O P-5'-P-CCNC: 17 U/L (ref 10–44)
ANION GAP SERPL CALC-SCNC: 10 MMOL/L (ref 8–16)
AST SERPL-CCNC: 19 U/L (ref 10–40)
BASOPHILS # BLD AUTO: 0.02 K/UL (ref 0–0.2)
BASOPHILS NFR BLD: 0.5 % (ref 0–1.9)
BILIRUB SERPL-MCNC: 0.5 MG/DL (ref 0.1–1)
BILIRUB UR QL STRIP: NEGATIVE
BUN SERPL-MCNC: 8 MG/DL (ref 6–20)
CALCIUM SERPL-MCNC: 9.8 MG/DL (ref 8.7–10.5)
CHLORIDE SERPL-SCNC: 104 MMOL/L (ref 95–110)
CLARITY UR: ABNORMAL
CO2 SERPL-SCNC: 23 MMOL/L (ref 23–29)
COLOR UR: YELLOW
CREAT SERPL-MCNC: 0.9 MG/DL (ref 0.5–1.4)
DIFFERENTIAL METHOD BLD: ABNORMAL
EOSINOPHIL # BLD AUTO: 0.1 K/UL (ref 0–0.5)
EOSINOPHIL NFR BLD: 1.2 % (ref 0–8)
ERYTHROCYTE [DISTWIDTH] IN BLOOD BY AUTOMATED COUNT: 12.2 % (ref 11.5–14.5)
EST. GFR  (NO RACE VARIABLE): >60 ML/MIN/1.73 M^2
GLUCOSE SERPL-MCNC: 91 MG/DL (ref 70–110)
GLUCOSE UR QL STRIP: NEGATIVE
HCT VFR BLD AUTO: 40.2 % (ref 37–48.5)
HGB BLD-MCNC: 13.3 G/DL (ref 12–16)
HGB UR QL STRIP: NEGATIVE
IMM GRANULOCYTES # BLD AUTO: 0 K/UL (ref 0–0.04)
IMM GRANULOCYTES NFR BLD AUTO: 0 % (ref 0–0.5)
KETONES UR QL STRIP: NEGATIVE
LEUKOCYTE ESTERASE UR QL STRIP: NEGATIVE
LIPASE SERPL-CCNC: 19 U/L (ref 4–60)
LYMPHOCYTES # BLD AUTO: 2 K/UL (ref 1–4.8)
LYMPHOCYTES NFR BLD: 49.8 % (ref 18–48)
MCH RBC QN AUTO: 29.6 PG (ref 27–31)
MCHC RBC AUTO-ENTMCNC: 33.1 G/DL (ref 32–36)
MCV RBC AUTO: 89 FL (ref 82–98)
MONOCYTES # BLD AUTO: 0.3 K/UL (ref 0.3–1)
MONOCYTES NFR BLD: 6.1 % (ref 4–15)
NEUTROPHILS # BLD AUTO: 1.7 K/UL (ref 1.8–7.7)
NEUTROPHILS NFR BLD: 42.4 % (ref 38–73)
NITRITE UR QL STRIP: NEGATIVE
NRBC BLD-RTO: 0 /100 WBC
PH UR STRIP: 7 [PH] (ref 5–8)
PLATELET # BLD AUTO: 273 K/UL (ref 150–450)
PLATELET BLD QL SMEAR: ABNORMAL
PMV BLD AUTO: 9.3 FL (ref 9.2–12.9)
POTASSIUM SERPL-SCNC: 4.2 MMOL/L (ref 3.5–5.1)
PROT SERPL-MCNC: 8.2 G/DL (ref 6–8.4)
PROT UR QL STRIP: NEGATIVE
RBC # BLD AUTO: 4.5 M/UL (ref 4–5.4)
SODIUM SERPL-SCNC: 137 MMOL/L (ref 136–145)
SP GR UR STRIP: 1.01 (ref 1–1.03)
URN SPEC COLLECT METH UR: ABNORMAL
UROBILINOGEN UR STRIP-ACNC: NEGATIVE EU/DL
WBC # BLD AUTO: 4.1 K/UL (ref 3.9–12.7)

## 2024-08-03 PROCEDURE — 83690 ASSAY OF LIPASE: CPT | Performed by: EMERGENCY MEDICINE

## 2024-08-03 PROCEDURE — 96361 HYDRATE IV INFUSION ADD-ON: CPT

## 2024-08-03 PROCEDURE — 36415 COLL VENOUS BLD VENIPUNCTURE: CPT | Performed by: EMERGENCY MEDICINE

## 2024-08-03 PROCEDURE — 96374 THER/PROPH/DIAG INJ IV PUSH: CPT

## 2024-08-03 PROCEDURE — 81003 URINALYSIS AUTO W/O SCOPE: CPT | Performed by: EMERGENCY MEDICINE

## 2024-08-03 PROCEDURE — 99285 EMERGENCY DEPT VISIT HI MDM: CPT | Mod: 25

## 2024-08-03 PROCEDURE — 63600175 PHARM REV CODE 636 W HCPCS: Performed by: EMERGENCY MEDICINE

## 2024-08-03 PROCEDURE — 25000003 PHARM REV CODE 250: Performed by: EMERGENCY MEDICINE

## 2024-08-03 PROCEDURE — 85025 COMPLETE CBC W/AUTO DIFF WBC: CPT | Performed by: EMERGENCY MEDICINE

## 2024-08-03 PROCEDURE — 80053 COMPREHEN METABOLIC PANEL: CPT | Performed by: EMERGENCY MEDICINE

## 2024-08-03 RX ORDER — PANTOPRAZOLE SODIUM 40 MG/1
40 TABLET, DELAYED RELEASE ORAL DAILY
Qty: 30 TABLET | Refills: 3 | Status: SHIPPED | OUTPATIENT
Start: 2024-08-03 | End: 2025-08-03

## 2024-08-03 RX ORDER — SODIUM CHLORIDE 9 MG/ML
INJECTION, SOLUTION INTRAVENOUS
Status: COMPLETED | OUTPATIENT
Start: 2024-08-03 | End: 2024-08-03

## 2024-08-03 RX ORDER — ALUMINUM HYDROXIDE, MAGNESIUM HYDROXIDE, AND SIMETHICONE 1200; 120; 1200 MG/30ML; MG/30ML; MG/30ML
5 SUSPENSION ORAL
Status: COMPLETED | OUTPATIENT
Start: 2024-08-03 | End: 2024-08-03

## 2024-08-03 RX ORDER — PANTOPRAZOLE SODIUM 40 MG/1
40 TABLET, DELAYED RELEASE ORAL
Status: COMPLETED | OUTPATIENT
Start: 2024-08-03 | End: 2024-08-03

## 2024-08-03 RX ORDER — METOCLOPRAMIDE HYDROCHLORIDE 5 MG/ML
10 INJECTION INTRAMUSCULAR; INTRAVENOUS
Status: COMPLETED | OUTPATIENT
Start: 2024-08-03 | End: 2024-08-03

## 2024-08-03 RX ADMIN — SODIUM CHLORIDE: 9 INJECTION, SOLUTION INTRAVENOUS at 01:08

## 2024-08-03 RX ADMIN — PANTOPRAZOLE SODIUM 40 MG: 40 TABLET, DELAYED RELEASE ORAL at 01:08

## 2024-08-03 RX ADMIN — METOCLOPRAMIDE 10 MG: 5 INJECTION, SOLUTION INTRAMUSCULAR; INTRAVENOUS at 01:08

## 2024-08-03 RX ADMIN — ALUMINUM HYDROXIDE, MAGNESIUM HYDROXIDE, DIMETHICONE 5 ML: 400; 400; 40 SUSPENSION ORAL at 02:08

## 2024-08-08 ENCOUNTER — TELEPHONE (OUTPATIENT)
Dept: URGENT CARE | Facility: CLINIC | Age: 59
End: 2024-08-08

## 2024-08-28 NOTE — PATIENT INSTRUCTIONS
Keflex twice a day for 7 days.  Always take with food  Zofran as prescribed as needed for nausea/vomiting    As we discussed please go to the emergency department for further evaluation and management of current symptoms outside the scope of care that can be provided in an urgent care setting    Urine collected today was sent out to the lab for culture.  Expect results in 3-7 days.  You can be looking for the results on [facility] MyChart or someone from the clinic will be contacting you once we have received results and had time to review them     Detail Level: Detailed Detail Level: Zone Azithromycin Pregnancy And Lactation Text: This medication is considered safe during pregnancy and is also secreted in breast milk. Minocycline Counseling: Patient advised regarding possible photosensitivity and discoloration of the teeth, skin, lips, tongue and gums.  Patient instructed to avoid sunlight, if possible.  When exposed to sunlight, patients should wear protective clothing, sunglasses, and sunscreen.  The patient was instructed to call the office immediately if the following severe adverse effects occur:  hearing changes, easy bruising/bleeding, severe headache, or vision changes.  The patient verbalized understanding of the proper use and possible adverse effects of minocycline.  All of the patient's questions and concerns were addressed. Benzoyl Peroxide Pregnancy And Lactation Text: This medication is Pregnancy Category C. It is unknown if benzoyl peroxide is excreted in breast milk. Sarecycline Counseling: Patient advised regarding possible photosensitivity and discoloration of the teeth, skin, lips, tongue and gums.  Patient instructed to avoid sunlight, if possible.  When exposed to sunlight, patients should wear protective clothing, sunglasses, and sunscreen.  The patient was instructed to call the office immediately if the following severe adverse effects occur:  hearing changes, easy bruising/bleeding, severe headache, or vision changes.  The patient verbalized understanding of the proper use and possible adverse effects of sarecycline.  All of the patient's questions and concerns were addressed. Bactrim Pregnancy And Lactation Text: This medication is Pregnancy Category D and is known to cause fetal risk.  It is also excreted in breast milk. Topical Retinoid Pregnancy And Lactation Text: This medication is Pregnancy Category C. It is unknown if this medication is excreted in breast milk. Spironolactone Counseling: Patient advised regarding risks of diarrhea, abdominal pain, hyperkalemia, birth defects (for female patients), liver toxicity and renal toxicity. The patient may need blood work to monitor liver and kidney function and potassium levels while on therapy. The patient verbalized understanding of the proper use and possible adverse effects of spironolactone.  All of the patient's questions and concerns were addressed. Moisturizer Recommendations: CeraVe moisturizer\\nWED Ultra Lite facial moisturizer Tazorac Pregnancy And Lactation Text: This medication is not safe during pregnancy. It is unknown if this medication is excreted in breast milk. Birth Control Pills Pregnancy And Lactation Text: This medication should be avoided if pregnant and for the first 30 days post-partum. Use Enhanced Medication Counseling?: No Dapsone Pregnancy And Lactation Text: This medication is Pregnancy Category C and is not considered safe during pregnancy or breast feeding. Topical Clindamycin Pregnancy And Lactation Text: This medication is Pregnancy Category B and is considered safe during pregnancy. It is unknown if it is excreted in breast milk. Tetracycline Counseling: Patient counseled regarding possible photosensitivity and increased risk for sunburn.  Patient instructed to avoid sunlight, if possible.  When exposed to sunlight, patients should wear protective clothing, sunglasses, and sunscreen.  The patient was instructed to call the office immediately if the following severe adverse effects occur:  hearing changes, easy bruising/bleeding, severe headache, or vision changes.  The patient verbalized understanding of the proper use and possible adverse effects of tetracycline.  All of the patient's questions and concerns were addressed. Patient understands to avoid pregnancy while on therapy due to potential birth defects. Erythromycin Counseling:  I discussed with the patient the risks of erythromycin including but not limited to GI upset, allergic reaction, drug rash, diarrhea, increase in liver enzymes, and yeast infections. Winlevi Counseling:  I discussed with the patient the risks of topical clascoterone including but not limited to erythema, scaling, itching, and stinging. Patient voiced their understanding. Spironolactone Pregnancy And Lactation Text: This medication can cause feminization of the male fetus and should be avoided during pregnancy. The active metabolite is also found in breast milk. Isotretinoin Counseling: Patient should get monthly blood tests, not donate blood, not drive at night if vision affected, not share medication, and not undergo elective surgery for 6 months after tx completed. Side effects reviewed, pt to contact office should one occur. Aklief Pregnancy And Lactation Text: It is unknown if this medication is safe to use during pregnancy.  It is unknown if this medication is excreted in breast milk.  Breastfeeding women should use the topical cream on the smallest area of the skin for the shortest time needed while breastfeeding.  Do not apply to nipple and areola. Tetracycline Pregnancy And Lactation Text: This medication is Pregnancy Category D and not consider safe during pregnancy. It is also excreted in breast milk. High Dose Vitamin A Counseling: Side effects reviewed, pt to contact office should one occur. Azelaic Acid Pregnancy And Lactation Text: This medication is considered safe during pregnancy and breast feeding. High Dose Vitamin A Pregnancy And Lactation Text: High dose vitamin A therapy is contraindicated during pregnancy and breast feeding. Topical Retinoid counseling:  Patient advised to apply a pea-sized amount only at bedtime and wait 30 minutes after washing their face before applying.  If too drying, patient may add a non-comedogenic moisturizer. The patient verbalized understanding of the proper use and possible adverse effects of retinoids.  All of the patient's questions and concerns were addressed. Bactrim Counseling:  I discussed with the patient the risks of sulfa antibiotics including but not limited to GI upset, allergic reaction, drug rash, diarrhea, dizziness, photosensitivity, and yeast infections.  Rarely, more serious reactions can occur including but not limited to aplastic anemia, agranulocytosis, methemoglobinemia, blood dyscrasias, liver or kidney failure, lung infiltrates or desquamative/blistering drug rashes. Tazorac Counseling:  Patient advised that medication is irritating and drying.  Patient may need to apply sparingly and wash off after an hour before eventually leaving it on overnight.  The patient verbalized understanding of the proper use and possible adverse effects of tazorac.  All of the patient's questions and concerns were addressed. Birth Control Pills Counseling: Birth Control Pill Counseling: I discussed with the patient the potential side effects of OCPs including but not limited to increased risk of stroke, heart attack, thrombophlebitis, deep venous thrombosis, hepatic adenomas, breast changes, GI upset, headaches, and depression.  The patient verbalized understanding of the proper use and possible adverse effects of OCPs. All of the patient's questions and concerns were addressed. Dapsone Counseling: I discussed with the patient the risks of dapsone including but not limited to hemolytic anemia, agranulocytosis, rashes, methemoglobinemia, kidney failure, peripheral neuropathy, headaches, GI upset, and liver toxicity.  Patients who start dapsone require monitoring including baseline LFTs and weekly CBCs for the first month, then every month thereafter.  The patient verbalized understanding of the proper use and possible adverse effects of dapsone.  All of the patient's questions and concerns were addressed. Sunscreen Recommendations: ELTA MD UV Clear sunscreen Topical Clindamycin Counseling: Patient counseled that this medication may cause skin irritation or allergic reactions.  In the event of skin irritation, the patient was advised to reduce the amount of the drug applied or use it less frequently.   The patient verbalized understanding of the proper use and possible adverse effects of clindamycin.  All of the patient's questions and concerns were addressed. Topical Sulfur Applications Counseling: Topical Sulfur Counseling: Patient counseled that this medication may cause skin irritation or allergic reactions.  In the event of skin irritation, the patient was advised to reduce the amount of the drug applied or use it less frequently.   The patient verbalized understanding of the proper use and possible adverse effects of topical sulfur application.  All of the patient's questions and concerns were addressed. Doxycycline Counseling:  Patient counseled regarding possible photosensitivity and increased risk for sunburn.  Patient instructed to avoid sunlight, if possible.  When exposed to sunlight, patients should wear protective clothing, sunglasses, and sunscreen.  The patient was instructed to call the office immediately if the following severe adverse effects occur:  hearing changes, easy bruising/bleeding, severe headache, or vision changes.  The patient verbalized understanding of the proper use and possible adverse effects of doxycycline.  All of the patient's questions and concerns were addressed. Aklief counseling:  Patient advised to apply a pea-sized amount only at bedtime and wait 30 minutes after washing their face before applying.  If too drying, patient may add a non-comedogenic moisturizer.  The most commonly reported side effects including irritation, redness, scaling, dryness, stinging, burning, itching, and increased risk of sunburn.  The patient verbalized understanding of the proper use and possible adverse effects of retinoids.  All of the patient's questions and concerns were addressed. Doxycycline Pregnancy And Lactation Text: This medication is Pregnancy Category D and not consider safe during pregnancy. It is also excreted in breast milk but is considered safe for shorter treatment courses. Azelaic Acid Counseling: Patient counseled that medicine may cause skin irritation and to avoid applying near the eyes.  In the event of skin irritation, the patient was advised to reduce the amount of the drug applied or use it less frequently.   The patient verbalized understanding of the proper use and possible adverse effects of azelaic acid.  All of the patient's questions and concerns were addressed. Cleanser Recommendations: CeraVe Gentle Hydrating Cleanser\\nNeutrogena oil-free acne cleanser with 2% salicylic acid\\nVanicream facial cleanser\\nWED Gentle Exfoliating cleanser (5% glycolic acid/2% salicylic acid) Topical Sulfur Applications Pregnancy And Lactation Text: This medication is Pregnancy Category C and has an unknown safety profile during pregnancy. It is unknown if this topical medication is excreted in breast milk. Azithromycin Counseling:  I discussed with the patient the risks of azithromycin including but not limited to GI upset, allergic reaction, drug rash, diarrhea, and yeast infections. Benzoyl Peroxide Counseling: Patient counseled that medicine may cause skin irritation and bleach clothing.  In the event of skin irritation, the patient was advised to reduce the amount of the drug applied or use it less frequently.   The patient verbalized understanding of the proper use and possible adverse effects of benzoyl peroxide.  All of the patient's questions and concerns were addressed. Winlevi Pregnancy And Lactation Text: This medication is considered safe during pregnancy and breastfeeding. Erythromycin Pregnancy And Lactation Text: This medication is Pregnancy Category B and is considered safe during pregnancy. It is also excreted in breast milk. Isotretinoin Pregnancy And Lactation Text: This medication is Pregnancy Category X and is considered extremely dangerous during pregnancy. It is unknown if it is excreted in breast milk.

## 2024-09-19 DIAGNOSIS — Z80.0 FAMILY HISTORY OF COLON CANCER: ICD-10-CM

## 2024-09-19 DIAGNOSIS — R10.13 ABDOMINAL PAIN, EPIGASTRIC: Primary | ICD-10-CM

## 2024-09-19 DIAGNOSIS — Z80.0 FAMILY HISTORY OF MALIGNANT NEOPLASM OF PANCREAS: ICD-10-CM

## 2024-09-19 DIAGNOSIS — Z12.11 SCREENING FOR COLON CANCER: ICD-10-CM

## 2024-09-19 DIAGNOSIS — N83.209 OVARIAN CYST: ICD-10-CM

## 2024-09-20 ENCOUNTER — OFFICE VISIT (OUTPATIENT)
Dept: FAMILY MEDICINE | Facility: CLINIC | Age: 59
End: 2024-09-20
Payer: COMMERCIAL

## 2024-09-20 ENCOUNTER — HOSPITAL ENCOUNTER (OUTPATIENT)
Dept: RADIOLOGY | Facility: CLINIC | Age: 59
Discharge: HOME | End: 2024-09-20
Attending: STUDENT IN AN ORGANIZED HEALTH CARE EDUCATION/TRAINING PROGRAM
Payer: COMMERCIAL

## 2024-09-20 VITALS
BODY MASS INDEX: 25.19 KG/M2 | HEART RATE: 97 BPM | SYSTOLIC BLOOD PRESSURE: 130 MMHG | OXYGEN SATURATION: 98 % | HEIGHT: 66 IN | WEIGHT: 156.75 LBS | RESPIRATION RATE: 18 BRPM | DIASTOLIC BLOOD PRESSURE: 84 MMHG

## 2024-09-20 DIAGNOSIS — G62.9 PERIPHERAL POLYNEUROPATHY: ICD-10-CM

## 2024-09-20 DIAGNOSIS — M79.661 RIGHT CALF PAIN: ICD-10-CM

## 2024-09-20 DIAGNOSIS — Z12.31 ENCOUNTER FOR SCREENING MAMMOGRAM FOR MALIGNANT NEOPLASM OF BREAST: ICD-10-CM

## 2024-09-20 DIAGNOSIS — Z00.00 ROUTINE GENERAL MEDICAL EXAMINATION AT A HEALTH CARE FACILITY: Primary | ICD-10-CM

## 2024-09-20 PROCEDURE — 3008F BODY MASS INDEX DOCD: CPT | Mod: CPTII,S$GLB,, | Performed by: STUDENT IN AN ORGANIZED HEALTH CARE EDUCATION/TRAINING PROGRAM

## 2024-09-20 PROCEDURE — 99396 PREV VISIT EST AGE 40-64: CPT | Mod: S$GLB,,, | Performed by: STUDENT IN AN ORGANIZED HEALTH CARE EDUCATION/TRAINING PROGRAM

## 2024-09-20 PROCEDURE — 1159F MED LIST DOCD IN RCRD: CPT | Mod: CPTII,S$GLB,, | Performed by: STUDENT IN AN ORGANIZED HEALTH CARE EDUCATION/TRAINING PROGRAM

## 2024-09-20 PROCEDURE — 1160F RVW MEDS BY RX/DR IN RCRD: CPT | Mod: CPTII,S$GLB,, | Performed by: STUDENT IN AN ORGANIZED HEALTH CARE EDUCATION/TRAINING PROGRAM

## 2024-09-20 PROCEDURE — 73630 X-RAY EXAM OF FOOT: CPT | Mod: TC,FY,PO,RT

## 2024-09-20 PROCEDURE — 3075F SYST BP GE 130 - 139MM HG: CPT | Mod: CPTII,S$GLB,, | Performed by: STUDENT IN AN ORGANIZED HEALTH CARE EDUCATION/TRAINING PROGRAM

## 2024-09-20 PROCEDURE — 73610 X-RAY EXAM OF ANKLE: CPT | Mod: TC,FY,PO,RT

## 2024-09-20 PROCEDURE — 99999 PR PBB SHADOW E&M-EST. PATIENT-LVL V: CPT | Mod: PBBFAC,,, | Performed by: STUDENT IN AN ORGANIZED HEALTH CARE EDUCATION/TRAINING PROGRAM

## 2024-09-20 PROCEDURE — 3079F DIAST BP 80-89 MM HG: CPT | Mod: CPTII,S$GLB,, | Performed by: STUDENT IN AN ORGANIZED HEALTH CARE EDUCATION/TRAINING PROGRAM

## 2024-09-20 PROCEDURE — 73630 X-RAY EXAM OF FOOT: CPT | Mod: 26,RT,, | Performed by: STUDENT IN AN ORGANIZED HEALTH CARE EDUCATION/TRAINING PROGRAM

## 2024-09-20 PROCEDURE — 73610 X-RAY EXAM OF ANKLE: CPT | Mod: 26,RT,, | Performed by: STUDENT IN AN ORGANIZED HEALTH CARE EDUCATION/TRAINING PROGRAM

## 2024-09-20 NOTE — PROGRESS NOTES
"[facility] MEDICINE CLINIC NOTE    Patient Name: Rut Hurtado  YOB: 1965    PRESENTING HISTORY     History of Present Illness:  Ms. Rut Hurtado is a 58 y.o. female here for routine checkup.     C/o tingling in feet bilaterally. Entirety of foot/leg  Occ with pain in posterior calf.   R>L  One episode radiated all the way to upper leg/buttock.   Onset of symptoms a few months ago.   1 episode of weakness during first episode, none since.   Had fever 2/2 covid, no other.   No unexpected weight loss.     Reviewed recommended cancer screenings, due for mammogram.       ROS      OBJECTIVE:   Vital Signs:  Vitals:    09/20/24 1316   BP: 130/84   Pulse: 97   Resp: 18   SpO2: 98%   Weight: 71.1 kg (156 lb 12 oz)   Height: 5' 6" (1.676 m)         Physical Exam  Vitals and nursing note reviewed.   Constitutional:       Appearance: She is not diaphoretic.   HENT:      Head: Normocephalic and atraumatic.      Right Ear: External ear normal.      Left Ear: External ear normal.   Eyes:      General: No scleral icterus.     Conjunctiva/sclera: Conjunctivae normal.      Pupils: Pupils are equal, round, and reactive to light.   Neck:      Thyroid: No thyromegaly.   Cardiovascular:      Rate and Rhythm: Normal rate and regular rhythm.      Heart sounds: Normal heart sounds. No murmur heard.  Pulmonary:      Effort: Pulmonary effort is normal. No respiratory distress.      Breath sounds: Normal breath sounds. No wheezing or rales.   Musculoskeletal:         General: No tenderness or deformity. Normal range of motion.      Cervical back: Normal range of motion and neck supple.      Comments: Mild right calf tenderness. Pain with plantar flexion over posterior ankle/achilles. No ttp over achilles. No plantar fascia ttp.    Lymphadenopathy:      Cervical: No cervical adenopathy.   Skin:     General: Skin is warm and dry.      Findings: No erythema or rash.   Neurological:      Mental Status: She is alert and " oriented to person, place, and time.      Gait: Gait is intact.   Psychiatric:         Mood and Affect: Mood and affect normal.         Cognition and Memory: Memory normal.         Judgment: Judgment normal.         ASSESSMENT & PLAN:     Routine general medical examination at a health care facility  -     COMPREHENSIVE METABOLIC PANEL; Future; Expected date: 09/20/2024  -     LIPID PANEL; Future; Expected date: 09/20/2024    Peripheral polyneuropathy  -     VITAMIN B1; Future; Expected date: 09/20/2024  -     VITAMIN B12; Future; Expected date: 09/20/2024  -     PROTEIN ELECTROPHORESIS, SERUM; Future; Expected date: 09/20/2024    Encounter for screening mammogram for malignant neoplasm of breast  -     Mammo Digital Screening Bilat w/ Nickolas; Future; Expected date: 09/20/2024    Right calf pain  -     X-Ray Ankle Complete Right; Future; Expected date: 09/20/2024  -     X-Ray Foot Complete Right; Future; Expected date: 09/20/2024  -     Ambulatory referral/consult to Podiatry; Future; Expected date: 09/27/2024  -     US Lower Extremity Veins Right; Future; Expected date: 09/20/2024     Her description sounds more neuropathic but exam c/w mechanical MSK etiology. Xrays negative, get US out of abdundance of caution. Will ask for podiatry eval, see if orthotic might be beneficial.         Ernesto Woody  Internal Medicine

## 2024-09-22 DIAGNOSIS — E78.5 HYPERLIPIDEMIA, UNSPECIFIED HYPERLIPIDEMIA TYPE: Primary | ICD-10-CM

## 2024-09-22 RX ORDER — ROSUVASTATIN CALCIUM 10 MG/1
10 TABLET, COATED ORAL DAILY
Qty: 90 TABLET | Refills: 3 | Status: SHIPPED | OUTPATIENT
Start: 2024-09-22 | End: 2025-09-22

## 2024-10-01 ENCOUNTER — HOSPITAL ENCOUNTER (OUTPATIENT)
Dept: RADIOLOGY | Facility: HOSPITAL | Age: 59
Discharge: HOME | End: 2024-10-01
Attending: STUDENT IN AN ORGANIZED HEALTH CARE EDUCATION/TRAINING PROGRAM
Payer: COMMERCIAL

## 2024-10-01 DIAGNOSIS — M79.661 RIGHT CALF PAIN: ICD-10-CM

## 2024-10-01 PROCEDURE — 93971 EXTREMITY STUDY: CPT | Mod: TC,PO,RT

## 2024-10-01 PROCEDURE — 93971 EXTREMITY STUDY: CPT | Mod: 26,RT,, | Performed by: RADIOLOGY

## 2024-10-02 ENCOUNTER — HOSPITAL ENCOUNTER (OUTPATIENT)
Dept: RADIOLOGY | Facility: HOSPITAL | Age: 59
Discharge: HOME | End: 2024-10-02
Attending: INTERNAL MEDICINE
Payer: COMMERCIAL

## 2024-10-02 DIAGNOSIS — R10.13 ABDOMINAL PAIN, EPIGASTRIC: ICD-10-CM

## 2024-10-02 DIAGNOSIS — N83.209 OVARIAN CYST: ICD-10-CM

## 2024-10-02 DIAGNOSIS — Z80.0 FAMILY HISTORY OF MALIGNANT NEOPLASM OF PANCREAS: ICD-10-CM

## 2024-10-02 DIAGNOSIS — Z80.0 FAMILY HISTORY OF COLON CANCER: ICD-10-CM

## 2024-10-02 DIAGNOSIS — Z12.11 SCREENING FOR COLON CANCER: ICD-10-CM

## 2024-10-02 PROCEDURE — 74177 CT ABD & PELVIS W/CONTRAST: CPT | Mod: TC

## 2024-10-02 PROCEDURE — 25500020 PHARM REV CODE 255: Performed by: INTERNAL MEDICINE

## 2024-10-02 PROCEDURE — 74177 CT ABD & PELVIS W/CONTRAST: CPT | Mod: 26,,, | Performed by: RADIOLOGY

## 2024-10-02 RX ADMIN — IOHEXOL 100 ML: 350 INJECTION, SOLUTION INTRAVENOUS at 09:10

## 2024-10-03 ENCOUNTER — OFFICE VISIT (OUTPATIENT)
Dept: PODIATRY | Facility: CLINIC | Age: 59
End: 2024-10-03
Payer: COMMERCIAL

## 2024-10-03 VITALS — RESPIRATION RATE: 18 BRPM | HEIGHT: 66 IN | BODY MASS INDEX: 24.87 KG/M2 | WEIGHT: 154.75 LBS | HEART RATE: 96 BPM

## 2024-10-03 DIAGNOSIS — M79.671 RIGHT FOOT PAIN: Primary | ICD-10-CM

## 2024-10-03 DIAGNOSIS — M77.51 CAPSULITIS OF METATARSOPHALANGEAL (MTP) JOINT OF RIGHT FOOT: ICD-10-CM

## 2024-10-03 DIAGNOSIS — M79.661 RIGHT CALF PAIN: ICD-10-CM

## 2024-10-03 DIAGNOSIS — M77.41 METATARSALGIA OF RIGHT FOOT: ICD-10-CM

## 2024-10-03 DIAGNOSIS — G57.61 MORTON'S NEUROMA OF RIGHT FOOT: ICD-10-CM

## 2024-10-03 DIAGNOSIS — M62.461 GASTROCNEMIUS EQUINUS OF RIGHT LOWER EXTREMITY: ICD-10-CM

## 2024-10-03 DIAGNOSIS — L90.9 FAT PAD ATROPHY OF FOOT: ICD-10-CM

## 2024-10-03 PROCEDURE — 99999 PR PBB SHADOW E&M-EST. PATIENT-LVL IV: CPT | Mod: PBBFAC,,,

## 2024-10-03 PROCEDURE — 99204 OFFICE O/P NEW MOD 45 MIN: CPT | Mod: S$GLB,,,

## 2024-10-03 PROCEDURE — 3008F BODY MASS INDEX DOCD: CPT | Mod: CPTII,S$GLB,,

## 2024-10-03 RX ORDER — METHYLPREDNISOLONE 4 MG/1
TABLET ORAL
Qty: 21 EACH | Refills: 0 | Status: SHIPPED | OUTPATIENT
Start: 2024-10-03 | End: 2024-10-24

## 2024-10-03 NOTE — PROGRESS NOTES
"  [address] [facility] [address] [place] [address]  Phone: [phone]   [phone]    Patient's PCP:Ernesto Woody MD  Referring Provider: Dr. Ernesto Woody    Subjective:      Chief Complaint:: Foot Pain (Right foot pain ball )    LAINE Hurtado is a 58 y.o. female who presents today with a complaint of right ball of foot pain for the last 2-3 months ago. The symptoms include heavy pain with pressure, sometimes radiates up the leg. The symptoms are aggravated by walking, weightbearing flexing great toe. The problem has worsened. Treatment to date have included rest and x-ray 9/2024 which provided some relief.     Vitals:    10/03/24 0814   Pulse: 96   Resp: 18   Weight: 70.2 kg (154 lb 12.2 oz)   Height: 5' 6" (1.676 m)   PainSc:   3      Shoe Size: 8.5    Past Surgical History:   Procedure Laterality Date    COLONOSCOPY N/A 11/08/2022    Procedure: COLONOSCOPY;  Surgeon: Tracy Calix MD;  Location: [facility];  Service: Endoscopy;  Laterality: N/A;    HYSTERECTOMY      JULIEN with removal "1 ovary"     History reviewed. No pertinent past medical history.  Family History   Problem Relation Name Age of Onset    Cancer Mother          colon        Social History:   Marital Status:   Alcohol History:  reports no history of alcohol use.  Tobacco History:  reports that she has never smoked. She has never used smokeless tobacco.  Drug History:  reports no history of drug use.    Review of patient's allergies indicates:  No Known Allergies    Current Outpatient Medications   Medication Sig Dispense Refill    ascorbic acid, vitamin C, (VITAMIN C) 1000 MG tablet Take 1,000 mg by mouth once daily.      co-enzyme Q-10 50 mg capsule Take 100 mg by mouth once daily.      ergocalciferol (ERGOCALCIFEROL) 50,000 unit Cap Take 50,000 Units by mouth every 7 days.      estradioL (ESTRACE) 1 MG tablet Take 1 tablet (1 mg total) by mouth once daily. 90 tablet 2    methylsulfonylmethane (MSM) 1,000 mg Cap Take " by mouth once daily. 3000 mg      niacin 500 MG Tab Take 500 mg by mouth every evening.      ondansetron (ZOFRAN-ODT) 4 MG TbDL Take 1 tablet (4 mg total) by mouth every 8 (eight) hours as needed (nausea). (Patient not taking: Reported on 10/29/2024) 20 tablet 0    pantoprazole (PROTONIX) 40 MG tablet Take 1 tablet (40 mg total) by mouth once daily. 30 tablet 3    rosuvastatin (CRESTOR) 10 MG tablet Take 1 tablet (10 mg total) by mouth once daily. 90 tablet 3     No current facility-administered medications for this visit.       Review of Systems   Constitutional:  Negative for activity change, chills and fever.   Cardiovascular:  Negative for leg swelling.   Gastrointestinal:  Negative for diarrhea, nausea and vomiting.   Musculoskeletal:  Negative for arthralgias, back pain, gait problem, joint swelling and myalgias.   Skin:  Negative for color change, pallor, rash and wound.   Neurological:  Negative for dizziness, tremors, weakness and numbness.         Objective:        Physical Exam:   Foot Exam    Right Foot/Ankle     Inspection and Palpation  Ecchymosis: none  Tenderness: (3rd MTPJ, Along the PF band, 3rd IM space)  Swelling: none   Arch: normal  Hammertoes: absent  Claw Toes: absent  Hallux valgus: no  Hallux limitus: no  Skin Exam: skin intact; no callus, no dry skin, no ulcer and no erythema   Fungus Toenails: not present  Neurovascular  Dorsalis pedis: doppler  Posterior tibial: doppler  Saphenous nerve sensation: normal  Tibial nerve sensation: normal  Superficial peroneal nerve sensation: normal  Deep peroneal nerve sensation: normal  Sural nerve sensation: normal  Achilles reflex: 2+  Babinski reflex: 2+    Muscle Strength  Ankle dorsiflexion: 5  Ankle plantar flexion: 5  Ankle inversion: 5  Ankle eversion: 5  Great toe extension: 5  Great toe flexion: 5    Range of Motion    Passive  Ankle dorsiflexion: 0 (pain in the posterior calf), pain      Tests  Anterior drawer: negative   Varus tilt: negative    Talar tilt: negative   PT Tinel's sign: negative    Mj's sign: positive(3rd IM space)  Valleix sign: negative          Imaging:     R foot Xray 3 views 9/20/24  Short 1st metarsal.   Stable small ossicle adjacent to the distal fibula.  Stable spurring of the dorsal navicular.             Assessment:       1. Right foot pain    2. Right calf pain    3. Gastrocnemius equinus of right lower extremity    4. Metatarsalgia of right foot    5. Quintanilla's neuroma of right foot    6. Fat pad atrophy of foot    7. Capsulitis of metatarsophalangeal (MTP) joint of right foot      Plan:   Right foot pain  -     US Lower Extremity Arteries Left; Future; Expected date: 10/03/2024    Right calf pain  -     Ambulatory referral/consult to Podiatry  -     US Lower Extremity Arteries Left; Future; Expected date: 10/03/2024    Gastrocnemius equinus of right lower extremity    Metatarsalgia of right foot    Quintanilla's neuroma of right foot    Fat pad atrophy of foot    Capsulitis of metatarsophalangeal (MTP) joint of right foot    Other orders  -     methylPREDNISolone (MEDROL DOSEPACK) 4 mg tablet; use as directed  Dispense: 21 each; Refill: 0      I provided patient education verbally regarding: Patient diagnosis, treatment options, as well as alternatives, risks, and benefits.     Short 1st met and forefoot overloading w/ equinus: 3rd MTPJ capsulitis vs neuroma     Xray interpreted above, reviewed at length with patient     Metatarsal pad provided    Patient will perform tendo-Achilles complex directed stretches demonstrated in clinic to address ankle equinus by reducing forefoot loading pressure in the gait cycle in turn reducing stress on the plantar fascia.    Patient will trial over-the-counter orthotics to help support the arch and plantar fascia as well as to help evenly distribute distribute foot loading pressure.  Break-in period reviewed.    Patient will obtain a night splint to perform passive stretching of the  tendo-Achilles complex, recommend trying to wear for at least 2-4 hours/day, reviewed may be tripping/falling hazard if wore at night    Recommended shoe gear modifications include a stiff shank, deep heel cup, and wide toe box.    Most recent CBC (no thrombocytopenia), CMP (normal renal function), HgA1c (5.3), and current medications reviewed.     Medrol Dose pack dispensed to patient's pharmacy to be taken as directed to help progress healing out of inflammatory state.     Arterial US B/L LE ordered    Follow up 4 weeks     This note was created using voice recognition software that occasionally misinterpreted phrases or words.     Vladislav Sanchez DPM  Podiatry / Foot and Ankle Surgery   Secure chat preferred

## 2024-10-17 ENCOUNTER — PATIENT MESSAGE (OUTPATIENT)
Dept: RESEARCH | Facility: HOSPITAL | Age: 59
End: 2024-10-17
Payer: COMMERCIAL

## 2024-10-29 ENCOUNTER — OFFICE VISIT (OUTPATIENT)
Dept: PODIATRY | Facility: CLINIC | Age: 59
End: 2024-10-29
Payer: COMMERCIAL

## 2024-10-29 VITALS — HEIGHT: 66 IN | WEIGHT: 157.88 LBS | BODY MASS INDEX: 25.37 KG/M2

## 2024-10-29 DIAGNOSIS — L90.9 FAT PAD ATROPHY OF FOOT: ICD-10-CM

## 2024-10-29 DIAGNOSIS — G57.61 MORTON'S NEUROMA OF RIGHT FOOT: ICD-10-CM

## 2024-10-29 DIAGNOSIS — M79.661 RIGHT CALF PAIN: ICD-10-CM

## 2024-10-29 DIAGNOSIS — M79.671 RIGHT FOOT PAIN: ICD-10-CM

## 2024-10-29 DIAGNOSIS — M62.461 GASTROCNEMIUS EQUINUS OF RIGHT LOWER EXTREMITY: Primary | ICD-10-CM

## 2024-10-29 DIAGNOSIS — M77.41 METATARSALGIA OF RIGHT FOOT: ICD-10-CM

## 2024-10-29 DIAGNOSIS — M77.51 CAPSULITIS OF METATARSOPHALANGEAL (MTP) JOINT OF RIGHT FOOT: ICD-10-CM

## 2024-10-29 PROCEDURE — 1160F RVW MEDS BY RX/DR IN RCRD: CPT | Mod: CPTII,S$GLB,,

## 2024-10-29 PROCEDURE — 1159F MED LIST DOCD IN RCRD: CPT | Mod: CPTII,S$GLB,,

## 2024-10-29 PROCEDURE — 99213 OFFICE O/P EST LOW 20 MIN: CPT | Mod: S$GLB,,,

## 2024-10-29 PROCEDURE — 99999 PR PBB SHADOW E&M-EST. PATIENT-LVL III: CPT | Mod: PBBFAC,,,

## 2024-10-29 PROCEDURE — 3008F BODY MASS INDEX DOCD: CPT | Mod: CPTII,S$GLB,,

## 2024-10-29 NOTE — PROGRESS NOTES
"  [address] [facility] [address] [place] [address]  Phone: [phone]   [phone]    Patient's PCP:Ernesto Woody MD  Referring Provider: No ref. provider found    Subjective:      Chief Complaint:: Follow-up (Follow up on right foot pain )    Follow-up  Pertinent negatives include no abdominal pain, arthralgias, chest pain, chills, coughing, fatigue, fever, headaches, joint swelling, myalgias, nausea, neck pain, numbness, rash or weakness.     HPI 10/29/2024  Rut Hurtado is a 59 y.o. female who presents today with a Follow up on right foot pain. Patient has tried inserts. Pt states the pain has not improved and was unable to take the steroid due to getting shingles shot. No new complaints. Pt presents in tennis shoes.     HPI 10/03/2024  Rut Hurtado is a 58 y.o. female who presents today with a complaint of right ball of foot pain for the last 2-3 months ago. The symptoms include heavy pain with pressure, sometimes radiates up the leg. The symptoms are aggravated by walking, weightbearing flexing great toe. The problem has worsened. Treatment to date have included rest and x-ray 9/2024 which provided some relief.        Vitals:    10/29/24 0904   Weight: 71.6 kg (157 lb 13.6 oz)   Height: 5' 6" (1.676 m)   PainSc:   5      Shoe Size: 8.5    Past Surgical History:   Procedure Laterality Date    COLONOSCOPY N/A 11/08/2022    Procedure: COLONOSCOPY;  Surgeon: Tracy Cailx MD;  Location: [facility];  Service: Endoscopy;  Laterality: N/A;    HYSTERECTOMY      JULIEN with removal "1 ovary"     History reviewed. No pertinent past medical history.  Family History   Problem Relation Name Age of Onset    Cancer Mother          colon        Social History:   Marital Status:   Alcohol History:  reports no history of alcohol use.  Tobacco History:  reports that she has never smoked. She has never used smokeless tobacco.  Drug History:  reports no history of drug use.    Review of patient's allergies " indicates:  No Known Allergies    Current Outpatient Medications   Medication Sig Dispense Refill    ascorbic acid, vitamin C, (VITAMIN C) 1000 MG tablet Take 1,000 mg by mouth once daily.      co-enzyme Q-10 50 mg capsule Take 100 mg by mouth once daily.      ergocalciferol (ERGOCALCIFEROL) 50,000 unit Cap Take 50,000 Units by mouth every 7 days.      estradioL (ESTRACE) 1 MG tablet Take 1 tablet (1 mg total) by mouth once daily. 90 tablet 2    methylsulfonylmethane (MSM) 1,000 mg Cap Take by mouth once daily. 3000 mg      niacin 500 MG Tab Take 500 mg by mouth every evening.      ondansetron (ZOFRAN-ODT) 4 MG TbDL Take 1 tablet (4 mg total) by mouth every 8 (eight) hours as needed (nausea). 20 tablet 0    pantoprazole (PROTONIX) 40 MG tablet Take 1 tablet (40 mg total) by mouth once daily. 30 tablet 3    rosuvastatin (CRESTOR) 10 MG tablet Take 1 tablet (10 mg total) by mouth once daily. 90 tablet 3     No current facility-administered medications for this visit.       Review of Systems   Constitutional:  Negative for chills, fatigue, fever and unexpected weight change.   HENT:  Negative for hearing loss and trouble swallowing.    Eyes:  Negative for photophobia and visual disturbance.   Respiratory:  Negative for cough, shortness of breath and wheezing.    Cardiovascular:  Negative for chest pain, palpitations and leg swelling.   Gastrointestinal:  Negative for abdominal pain and nausea.   Genitourinary:  Negative for dysuria and frequency.   Musculoskeletal:  Negative for arthralgias, back pain, gait problem, joint swelling, myalgias and neck pain.   Skin:  Negative for rash and wound.   Neurological:  Negative for tremors, seizures, weakness, numbness and headaches.   Hematological:  Does not bruise/bleed easily.         Objective:        Physical Exam:   Foot Exam    Right Foot/Ankle     Inspection and Palpation  Ecchymosis: none  Tenderness: (3rd MTPJ, Along the PF band, 3rd IM space)  Swelling: none   Arch:  normal  Hammertoes: absent  Claw Toes: absent  Hallux valgus: no  Hallux limitus: no  Skin Exam: skin intact; no callus, no dry skin, no ulcer and no erythema   Fungus Toenails: not present  Neurovascular  Dorsalis pedis: doppler  Posterior tibial: doppler  Saphenous nerve sensation: normal  Tibial nerve sensation: normal  Superficial peroneal nerve sensation: normal  Deep peroneal nerve sensation: normal  Sural nerve sensation: normal  Achilles reflex: 2+  Babinski reflex: 2+    Muscle Strength  Ankle dorsiflexion: 5  Ankle plantar flexion: 5  Ankle inversion: 5  Ankle eversion: 5  Great toe extension: 5  Great toe flexion: 5    Range of Motion    Passive  Ankle dorsiflexion: 0 (pain in the posterior calf), pain      Tests  Anterior drawer: negative   Varus tilt: negative   Talar tilt: negative   PT Tinel's sign: negative    Mj's sign: positive(3rd IM space)  Valleix sign: negative               Assessment:       1. Gastrocnemius equinus of right lower extremity    2. Quintanilla's neuroma of right foot    3. Metatarsalgia of right foot    4. Right calf pain    5. Right foot pain    6. Fat pad atrophy of foot    7. Capsulitis of metatarsophalangeal (MTP) joint of right foot      Plan:   Gastrocnemius equinus of right lower extremity    Quintanilla's neuroma of right foot    Metatarsalgia of right foot    Right calf pain    Right foot pain    Fat pad atrophy of foot    Capsulitis of metatarsophalangeal (MTP) joint of right foot      Patient will get arterial US   Medrol dose pac  Powersteps   Met pads  Shoe gear mods  Physical therapy - Tendo achilles complex stretches, extensor strengthening exercises, eval and treat.   MRI next visit if no improvement   Follow up PRN     I provided patient education verbally regarding: Patient diagnosis, treatment options, as well as alternatives, risks, and benefits.   This note was created using Dragon voice recognition software that occasionally misinterpreted phrases or words.

## 2024-12-06 ENCOUNTER — OFFICE VISIT (OUTPATIENT)
Dept: URGENT CARE | Facility: CLINIC | Age: 59
End: 2024-12-06
Payer: COMMERCIAL

## 2024-12-06 ENCOUNTER — TELEPHONE (OUTPATIENT)
Dept: FAMILY MEDICINE | Facility: CLINIC | Age: 59
End: 2024-12-06
Payer: COMMERCIAL

## 2024-12-06 VITALS
HEART RATE: 83 BPM | DIASTOLIC BLOOD PRESSURE: 88 MMHG | WEIGHT: 157 LBS | OXYGEN SATURATION: 99 % | RESPIRATION RATE: 19 BRPM | HEIGHT: 67 IN | TEMPERATURE: 98 F | SYSTOLIC BLOOD PRESSURE: 148 MMHG | BODY MASS INDEX: 24.64 KG/M2

## 2024-12-06 DIAGNOSIS — H10.32 ACUTE CONJUNCTIVITIS OF LEFT EYE, UNSPECIFIED ACUTE CONJUNCTIVITIS TYPE: Primary | ICD-10-CM

## 2024-12-06 RX ORDER — NEOMYCIN SULFATE, POLYMYXIN B SULFATE AND DEXAMETHASONE 3.5; 10000; 1 MG/ML; [USP'U]/ML; MG/ML
1 SUSPENSION/ DROPS OPHTHALMIC
Qty: 5 ML | Status: SHIPPED | OUTPATIENT
Start: 2024-12-06 | End: 2024-12-06

## 2024-12-06 RX ORDER — NEOMYCIN SULFATE, POLYMYXIN B SULFATE AND DEXAMETHASONE 3.5; 10000; 1 MG/ML; [USP'U]/ML; MG/ML
1 SUSPENSION/ DROPS OPHTHALMIC
Qty: 5 ML | Refills: 0 | Status: SHIPPED | OUTPATIENT
Start: 2024-12-06 | End: 2024-12-13

## 2024-12-06 NOTE — TELEPHONE ENCOUNTER
Unable to locate appointment on today's date with any provider in clinic or virtual.  Recommended for patient to be seen at [facility].  Patient verbalized understanding.

## 2024-12-06 NOTE — TELEPHONE ENCOUNTER
Alayna Hernandez Staff     ----- Message from Alayna sent at 12/6/2024  2:09 PM CST -----  Contact: Rut  Type:  Sooner Apoointment Request    Caller is requesting a sooner appointment.  Caller declined first available appointment listed below.  Caller will not accept being placed on the waitlist and is requesting a message be sent to doctor.  Name of Caller: Rut  When is the first available appointment?Monday Dec/9/2024, she ask if is possible to be seen today.  Symptoms:swollen and itchy, eye infection concerns  Would the patient rather a call back or a response via [facility]? Call back  Best Call Back Number:[phone]  Thanks!

## 2024-12-06 NOTE — PROGRESS NOTES
"Subjective:      Patient ID: Rut Hurtado is a 59 y.o. female.    Vitals:  height is 5' 7" (1.702 m) and weight is 71.2 kg (157 lb). Her oral temperature is 98 °F (36.7 °C). Her blood pressure is 148/88 (abnormal) and her pulse is 83. Her respiration is 19 and oxygen saturation is 99%.     Chief Complaint: Eye Problem    Patient is a 59-year-old female presenting for left eye swelling and drainage.  Patient states symptoms began a few days ago.  Patient states she does have some mild symptoms in hit her right eye.  Patient denies any eye pain, blurred vision or difficulty seeing.    Eye Problem   The left eye is affected. This is a new problem. Episode onset: 4 days. The problem occurs constantly. The problem has been gradually worsening. There was no injury mechanism. The pain is at a severity of 0/10. The patient is experiencing no pain. There is No known exposure to pink eye. She Does not wear contacts. Associated symptoms include an eye discharge (Left), eye redness (Left) and itching (Left). Pertinent negatives include no blurred vision, double vision or photophobia. She has tried nothing for the symptoms. The treatment provided no relief.     Eyes:  Positive for eye discharge (Left), eye itching (Left) and eye redness (Left). Negative for eye trauma, foreign body in eye, eye pain, photophobia, vision loss, double vision, blurred vision and eyelid swelling.    Objective:     Physical Exam   Constitutional: She is oriented to person, place, and time. She appears well-developed.   HENT:   Head: Normocephalic and atraumatic.   Ears:   Right Ear: External ear normal.   Left Ear: External ear normal.   Nose: Nose normal.   Mouth/Throat: Oropharynx is clear and moist.   Eyes: Conjunctivae, EOM and lids are normal. Pupils are equal, round, and reactive to light. No visual field deficit is present. Right eye exhibits no discharge and no hordeolum. No foreign body present in the right eye. Left eye exhibits discharge. " Left eye exhibits no chemosis, no exudate and no hordeolum. No foreign body present in the left eye. Left conjunctiva is not injected. Left conjunctiva has no hemorrhage. No scleral icterus. Extraocular movement intact vision grossly intact gaze aligned appropriately      Comments: Slight yellow drainage noted to left lower eye lid.  Slight swelling appreciated.  No pain with eye movement.  No signs of ocular cellulitis.   Neck: Trachea normal and phonation normal. Neck supple.   Musculoskeletal: Normal range of motion.         General: Normal range of motion.   Neurological: She is alert and oriented to person, place, and time.   Skin: Skin is warm, dry and intact.   Psychiatric: Her speech is normal and behavior is normal. Judgment and thought content normal.   Nursing note and vitals reviewed.    Assessment:     No diagnosis found.    Plan:       There are no diagnoses linked to this encounter.      Medical Decision Making:   Initial Assessment:   Evaluation of a 60 yo female presenting for left eye drainage, itching and swelling this.  Patient states symptoms began a few days ago.  Patient denies any blurred vision, difficulty seeing or eye pain.  On exam pt is A&Ox3. VSS. Nonfebrile and nontoxic appearing.  Mucous membranes pink and moist.  No scleral icterus noted.  Slight yellow drainage noted to left lower eye lid.  Slight swelling appreciated.  No pain with ocular movement.  No signs of ocular cellulitis.  Pt speaking in full sentences.  Steady gait appreciated. Cap refill < 3 seconds.      Differential Diagnosis:   Conjunctivitis, allergic reaction, stye, hordeolum, others  Urgent Care Management:  Patient advised will treat for acute conjunctivitis.  Advised use eye drops as prescribed.  Can use in right eye if symptoms continue.  Can use warm compresses to help with drainage.  Follow up with PCP as needed.  Return precautions discussed.  Patient verbalized understanding of this plan of care.  All  questions and concerns addressed.

## 2024-12-06 NOTE — PATIENT INSTRUCTIONS
You were seen and evaluated at urgent care today.  You can use warm compresses to help decrease drainage from your eye.  Use eye drops as prescribed.  Please follow-up with your PCP as needed.  Please proceed to the ED for any worsening symptoms such as chest pain, shortness of breath, fever not controlled with Tylenol or ibuprofen or uncontrolled pain.      Our goal at [facility] is to always give you outstanding care and exceptional service. You may receive a survey by mail or e-mail in the next week regarding your experience in our ED. We would greatly appreciate your completing and returning the survey. Your feedback provides us with a way to recognize our staff who give very good care and it helps us learn how to improve when your experience was below our aspiration of excellence.

## 2024-12-12 ENCOUNTER — CLINICAL SUPPORT (OUTPATIENT)
Dept: REHABILITATION | Facility: HOSPITAL | Age: 59
End: 2024-12-12
Payer: COMMERCIAL

## 2024-12-12 DIAGNOSIS — M62.89 PELVIC FLOOR DYSFUNCTION: Primary | ICD-10-CM

## 2024-12-12 PROCEDURE — 97162 PT EVAL MOD COMPLEX 30 MIN: CPT | Mod: PO

## 2024-12-12 PROCEDURE — 97530 THERAPEUTIC ACTIVITIES: CPT | Mod: PO

## 2024-12-12 NOTE — PROGRESS NOTES
"[facility]  Pelvic Health Physical Therapy Initial Evaluation    Date: 2024   Name: Rut Hurtado  Clinic Number: [id number]  Therapy Diagnosis: No diagnosis found.  Physician: Morenita De Guzman MD    Physician Orders: PT Eval and Treat   Medical Diagnosis from Referral: Pelvic floor dysfunction [M62.89]   Evaluation Date: 2024  Plan of Care Expiration: 3/12/2025  Visit # / Visits authorized:     FOTO 1 /3 on 2024      Time In: 1000  Time Out: 1045  Total Appointment Time (timed & untimed codes): 45 minutes    Precautions: universal     Subjective     Date of onset: about  4-5 years, getting worse.     History of current condition - Rut reports: stress urinary incontinence with sneezing, maybe coughing if she was coughing a lot. Also has some leakage with exercise.     OB/GYN History: , vaginal delivery, and episiotomy      Pain:  Location: right hip/RLQ pain - "all the time" not sure if there's anything that makes it better or worse - also reports right foot issues and it's causing issues up that leg.     Bladder/Bowel History: was put on Linzess so she could start having more bowel movements - hasn't started it yet (just prescribed on Tuesday). Reports history of chronic constipation. Also has hemorrhoids, Dr. De Guzman gave her a pill to address those.   Frequency of urination:   Daytime: can go 2 hrs between voids, states she "doesn't like to go to the bathroom" - stems from childhood because the bathrooms at school were gross so she got in the habit of holding it, doesn't like public toilets. Estimates she goes          Nighttime: varies - if she drinks more before bed she'll have to   Difficulty initiating urine stream: No  Urine stream: strong  Complete emptying: Yes, but sometimes she'll go back just incase before leaving the house.   Bladder leakage: Yes  Frequency of incidents: few times/week.   Amount leaked (urine): varies - if she sneezed, she estimates about  " quarter-sized amount. With exercise, only when she's doing a sudden movement.   Urinary Urgency: occasionally will have urgency, then there are times where she gets there and only had to void a small amount.   Frequency of bowel movements: estimates every 2-3 days, depending of what she eats.   Difficulty initiating BM: doesn't like the bathroom - so she doesn't go in there unless she knows she has to go.   Quality/Shape of BM: varies; seems like with age it's harder to pass  Does Patient Feel Empty after BM? Sometimes   Fiber Supplements or Laxative Use? No - was recently prescribed Linzess. Used to take Benefiber, has also tried Magnesium.   Colon leakage: No  Form of protection: none normally - may occasionally wear something if she is going to be out.        Medical History: Rut  has no past medical history on file.     Surgical History: Rut Hurtado  has a past surgical history that includes Hysterectomy and Colonoscopy (N/A, 11/08/2022).    Medications: Rut has a current medication list which includes the following prescription(s): ascorbic acid (vitamin c), co-enzyme q-10, ergocalciferol, estradiol, msm, neomycin-polymyxin-dexamethasone, niacin, ondansetron, pantoprazole, and rosuvastatin.    Allergies: Review of patient's allergies indicates:  No Known Allergies       Prior Therapy/Previous treatment included: none for this condition   Current exercise: walking (not much lately due to a foot problem); plays pickleball and may have stress urinary incontinence with a sudden stop/pivot   Prior Level of Function: independent   Current Level of Function: see above     Types of fluid intake: sodas like Sprite or 7-up bothers her bladder more than Coke. Water: doesn't drink as much as she used to - yesterday she didn't have any, has 16 oz a day at the most.       Pts goals: resolve stress urinary incontinence and constipation     OBJECTIVE       VAGINAL PELVIC FLOOR EXAM - to be performed next session (discussed  exam with patient today)      Limitation/Restriction for FOTO Pelvic Survey    Therapist reviewed FOTO scores for Rut Hurtado on 12/12/2024.   FOTO documents entered into [employer] - see Media section.       TREATMENT     Total Treatment time (time-based codes) separate from Evaluation: 30 minutes      Therapeutic Activity Patient participated in dynamic functional therapeutic activities to improve functional performance for 30 minutes. Including: Education as described below:     Patient Education provided:   general anatomy/physiology of urinary/ bowel  system and benefits of treatment were discussed with the pt. Additionally, anatomy/physiology of pelvic floor, bladder retraining, proper bearing down techniques, fluid intake/dietary modifications, and behavior modifications were reviewed.     Home Exercises provided:  Written Home Exercises provided: NEXT SESSION.  Exercises were reviewed and Rut was able to demonstrate them prior to the end of the session.    Rut demonstrated good  understanding of the education provided.     See EMR under Patient Instructions for exercises provided NEXT SESSION.    Assessment     Rut is a 59 y.o. female referred to outpatient Physical Therapy with a medical diagnosis of Pelvic floor dysfunction [M62.89] . Pt presents with poor knowledge of body mechanics and posture, decreased pelvic muscle strength, increased tension of the pelvic muscles, poor quality of pelvic muscle contraction, poor coordination of pelvic floor muscles during ADL's leading to urinary or fecal leakage, dysfunctional voiding, dysfunctional defecation, and unable to co-contract or co-relax abdominal wall and pelvic floor muscles.      Pt prognosis is Good.   Pt will benefit from skilled outpatient Physical Therapy to address the deficits stated above and in the chart below, provide pt/family education, and to maximize pt's level of independence.     Plan of care discussed with patient: Yes  Pt's spiritual,  cultural and educational needs considered and patient is agreeable to the plan of care and goals as stated below:     Anticipated Barriers for therapy: none    Medical Necessity is demonstrated by the following  History  Co-morbidities and personal factors that may impact the plan of care [] LOW: no personal factors / co-morbidities  [x] MODERATE: 1-2 personal factors / co-morbidities  [] HIGH: 3+ personal factors / co-morbidities    Moderate / High Support Documentation:   Co-morbidities affecting plan of care:  has no past medical history on file.     Personal Factors:   no deficits     Examination  Body Structures and Functions, activity limitations and participation restrictions that may impact the plan of care [] LOW: addressing 1-2 elements  [x] MODERATE: 3+ elements  [] HIGH: 4+ elements (please support below)    Moderate / High Support Documentation: see evaluation     Clinical Presentation [] LOW: stable  [x] MODERATE: Evolving  [] HIGH: Unstable     Decision Making/ Complexity Score: moderate         Goals:  Short Term Goals: 6 weeks   - Pt will demonstrate excellent knowledge and adherence to HEP to facilitate optimal recovery.  - Pt will demonstrate proper PFM contraction, relaxation, and lengthening coordinated with TA and breath for improved muscle coordination needed for functional activity.    Long Term Goals: 12 weeks   - Pt will demonstrate excellent knowledge and adherence to HEP for continued self-maintenance of symptoms.  - Pt will report FOTO score of 10% improvement or more indicating clinically relevant increase in function.  - Pt will report voiding interval of 2-3 hours for improved ADL tolerance.  - Pt will report ability to delay urinary urge for at least 15 minutes to maintain continence with ADL/IADLs.   - Pt will report no incidence of urinary incontinence 7/7 days for improved hygiene and ADL/IADL tolerance.   - Pt will demonstrate PFM strength of at least 3/5 MMT for improved  strength needed to maintain continence.   - Pt will report bearing down appropriately 100% of the time for improved bowel function and decreased stress on adjacent pelvic structures.   - Pt will demonstrate independence with pressure-management strategies to decreased stress on adjacent pelvic structures.       Plan     Plan of care Certification: 12/12/2024 to 3/12/2025.    Outpatient Physical Therapy 1 times weekly for 12 weeks to include the following interventions: therapeutic exercises, therapeutic activity, neuromuscular re-education, manual therapy, modalities PRN, patient/family education, dry needling, and self care/home management    Tram Magaña, PT, DPT, WCS

## 2024-12-12 NOTE — PATIENT INSTRUCTIONS
"Urination: urinate every 4 hours during the day - also make sure to urinate first thing in the morning and last thing before going to bed.     Bowel function - consider the following recommendations for optimizing bowel function. Good bowel health is important for overall pelvic floor function.     Adequate fluid intake is necessary for proper bowel function. Goal this week is to drink 64 ounces/day. Try completing your water bottle one time by lunch and second bottle by dinner time.     Fiber adds bulk to stool and promotes more frequent and regular bowel movements. Strategies to increase fiber intake:  One cup of high fiber cereal every day.  1/2 to 1 cup of prune juice or several stewed prunes every day, followed by a cup of hot water or tea.   2 cups of fruit or 2 1/2 cups vegetables, 6-8 oz high fiber grains daily  Fiber supplements, I.e. Metamucil, Citrucel, Konsyl, Benefiber  2 tablespoons of flax seed meal (can be added to cereal, smoothies, yogurt, oatmeal, etc)     Positioning and techniques for elimination     Bowel Movement Mechanics  1. Sit on the toilet comfortably with legs and buttocks relaxed.  2. Put your feet on a waste basket or squatty potty  3. Lean forward while keeping your back straight, forearms rest on knees.  4. Keep your knees apart.  5. Fully relax pelvic floor muscles - think about softening, opening, dropping  6. Use gentle belly breaths and bulge lower abdominals like making a beer belly  7. Keep belly big on exhalation  8. Exhale like blowing out birthday candles  9. Keep breathing like this through entire bowel movement  10. Make sure to give enough time, ok for it to take several minutes     Do not strain and Do not hold your breath.       (Search "Squatty Potty" on [employer])           "

## 2024-12-13 PROBLEM — M62.89 PELVIC FLOOR DYSFUNCTION: Status: ACTIVE | Noted: 2024-12-13

## 2024-12-17 RX ORDER — ESTRADIOL 1 MG/1
1 TABLET ORAL
Qty: 90 TABLET | Refills: 1 | Status: SHIPPED | OUTPATIENT
Start: 2024-12-17

## 2024-12-17 NOTE — TELEPHONE ENCOUNTER
[facility]      NOV: 1/13/25 [facility]     [facility][id number] - [place], [place] - [address] (Pharmacy)

## 2024-12-17 NOTE — TELEPHONE ENCOUNTER
----- Message from Rachele sent at 12/17/2024 11:06 AM CST -----  Regarding: Refill  Type:  RX Refill Request    Who Called: Pt    Refill or New Rx:Refill    RX Name and Strength:estradioL (ESTRACE) 1 MG tablet     How is the patient currently taking it? (ex. 1XDay):1xday    Is this a 30 day or 90 day RX:90    Preferred Pharmacy with phone number:  [facility]/pharmacy #[id number] - [place] - [address] [facility]  [address] [place] [address]  Phone: [phone] Fax: [phone]    Local or Mail Order:local    Ordering Provider:.    Would the patient rather a call back or a response via [facility]? Call back    Best Call Back Number:[phone]    Additional Information: Pt have appt 1/9/2024 but will be out of meds by then. Thanks

## 2024-12-18 ENCOUNTER — CLINICAL SUPPORT (OUTPATIENT)
Dept: REHABILITATION | Facility: HOSPITAL | Age: 59
End: 2024-12-18
Payer: COMMERCIAL

## 2024-12-18 DIAGNOSIS — M62.89 PELVIC FLOOR DYSFUNCTION: Primary | ICD-10-CM

## 2024-12-18 PROCEDURE — 97530 THERAPEUTIC ACTIVITIES: CPT | Mod: PO

## 2024-12-18 PROCEDURE — 97112 NEUROMUSCULAR REEDUCATION: CPT | Mod: PO

## 2024-12-18 NOTE — PROGRESS NOTES
Pelvic Health Physical Therapy   Treatment Note     Name: Rut Hurtado  Clinic Number: [id number]    Therapy Diagnosis:   Encounter Diagnosis   Name Primary?    Pelvic floor dysfunction Yes     Physician: Morenita De Guzman MD    Visit Date: 12/18/2024    Physician Orders: PT Eval and Treat   Medical Diagnosis from Referral: Pelvic floor dysfunction [M62.89]   Evaluation Date: 12/12/2024  Plan of Care Expiration: 3/12/2025  Visit # / Visits authorized: 2 total      FOTO 1 /3 on 12/12/2024        Time In: 1000  Time Out: 1045  Total Appointment Time (timed & untimed codes): 45 minutes     Precautions: universal     Subjective     Pt reports: doing well, no questions after initial eval. Consents to intravaginal exam today.  She was compliant with home exercise program.  Response to previous treatment: tolerated well   Functional change: ongoing     Pain: 0/10  Location: not applicable     Objective   Informed verbal consent provided 12/18/2024 prior to intravaginal exam.  Chaperone:  present     VAGINAL PELVIC FLOOR EXAM    EXTERNAL ASSESSMENT  Introitus: WNL  Skin condition: WNL  Scarring: none   Sensation: WNL   Pain: none  Voluntary contraction: visible lift  Voluntary relaxation: visible drop  Involuntary contraction: visible drop  Bearing down: bulge  Perineal descent: absent      INTERNAL ASSESSMENT  Pain: trigger points as follows: bilateral obturator internus; very mild tenderness to palpation over bilateral levator ani    Sensation: able to sense generalized pressure, unable to identify location   Vaginal vault: WNL   Muscle Bulk: areas of hypertonicity    Muscle Power: 4/5  Muscle Endurance: 3 sec     Quality of contraction: decreased hold and slow relaxation   Specificity: WNL   Coordination: tends to hold breath during PFM contration   Prolapse check: none  Comments: difficulty lengthening pelvic floor muscles with deep inspiration, able to with verbal cues and moderate pressure over lower abdomen        Rut participated in neuromuscular re-education activities for 35 minutes including:  Kegels - long holds/quick squeezes   Diaphragmatic breathing     Rut participated in dynamic functional therapeutic activities to improve functional performance for 10  minutes, including:  Plan of care review, discussion on prognosis        Home Exercises Provided and Patient Education Provided     Education provided:   - anatomy/physiology of pelvic floor, diaphragmatic breathing, kegels, fluid intake/dietary modifications, and behavior modifications  Discussed progression of plan of care with patient; educated pt in activity modification; reviewed HEP with pt. Pt demonstrated and verbalized understanding of all instruction and was provided with a handout of HEP (see Patient Instructions).      Written Home Exercises Provided: yes.  Exercises were reviewed and Rut was able to demonstrate them prior to the end of the session.  Rut demonstrated good  understanding of the education provided.     See EMR under Patient Instructions for exercises provided 12/18/2024.    Assessment     Intravaginal assessment performed per patient consent. Patient found to have good pelvic floor muscle strength but poor endurance, as well as difficulty coordinating PF relaxation with exhale - issued home exercise program and reviewed with patient.   Rut Is progressing well towards her goals.   Pt prognosis is Good.     Pt will continue to benefit from skilled outpatient physical therapy to address the deficits listed in the problem list box on initial evaluation, provide pt/family education and to maximize pt's level of independence in the home and community environment.     Pt's spiritual, cultural and educational needs considered and pt agreeable to plan of care and goals.     Anticipated barriers to physical therapy: none     Goals:  Short Term Goals: 6 weeks   - Pt will demonstrate excellent knowledge and adherence to HEP to facilitate  optimal recovery.  - Pt will demonstrate proper PFM contraction, relaxation, and lengthening coordinated with TA and breath for improved muscle coordination needed for functional activity.     Long Term Goals: 12 weeks   - Pt will demonstrate excellent knowledge and adherence to HEP for continued self-maintenance of symptoms.  - Pt will report FOTO score of 10% improvement or more indicating clinically relevant increase in function.  - Pt will report voiding interval of 2-3 hours for improved ADL tolerance.  - Pt will report ability to delay urinary urge for at least 15 minutes to maintain continence with ADL/IADLs.   - Pt will report no incidence of urinary incontinence 7/7 days for improved hygiene and ADL/IADL tolerance.   - Pt will demonstrate PFM strength of at least 3/5 MMT for improved strength needed to maintain continence.   - Pt will report bearing down appropriately 100% of the time for improved bowel function and decreased stress on adjacent pelvic structures.   - Pt will demonstrate independence with pressure-management strategies to decreased stress on adjacent pelvic structures.    Plan     Cont per plan of care     Tram Mgaaña, PT

## 2024-12-18 NOTE — PATIENT INSTRUCTIONS
Home Exercise Program: 12/18/2024    KEGELS - LONG HOLDS  1. Lay or Sit comfortably with legs and buttocks relaxed.  2. Contract and LIFT the pelvic floor muscles as if you're trying to stop the stream of urine and passage of gas.  3. Hold LIFT for 5 seconds without holding breath. You should be able to talk out loud the entire time.  4. Release the pelvic muscles right away for 10 seconds rest.  5. Repeat 10 times, 3 sets per day. Spread throughout the day.    KEGELS - QUICK SQUEEZES (perform after long holds)   1. Lay or Sit comfortably with legs and buttocks relaxed.  2. Contract and LIFT the pelvic floor muscles as if you're trying to stop the stream of urine and passage of gas.  3. Hold LIFT for 1-2 seconds without holding breath. You should be able to talk out loud the entire time.  4. Release the pelvic muscles right away for 1-2 seconds rest.  5. Repeat 10 times, 3 sets per day. Spread throughout the day.     No Kegels while urinating!     ________________________________________________________    DIAPHRAGMATIC BREATHING     The diaphragm is a dome shaped muscle that forms the floor of the rib cage. It is the most efficient muscle for breathing and relaxation, although most people are not used to using the diaphragm. Diaphragmatic or belly breathing is an important technique to learn because it helps settle down or relax the autonomic nervous system. The correct use of diaphragmatic breathing can help to quiet brain activity resulting in the relaxation of all the muscles and organs of the body. This is accomplished by slow rhythmic breathing concentrated in the diaphragm muscle rather than the chest.    How to do proper relaxation breathing:    Start by lying on your back or reclining in a chair in a relaxed position. Place one hand on your chest and the other on your abdomen.  Relax your jaw by placing your tongue on the floor of your mouth and keeping your teeth slightly apart.   Take a deep breath in,  letting the abdomen expand and rise while you keep your upper chest, neck and shoulders relaxed.   As you breathe out, allow your abdomen and chest to fall. Exhale completely.  It doesn't matter if you breathe in/out through your nose and/or mouth. Do whichever feels comfortable.  Remember to breathe slowly.  Do not force your breathing. Do not hold your breath.  Repeat for 5-10 minutes every day.

## 2025-01-10 ENCOUNTER — CLINICAL SUPPORT (OUTPATIENT)
Dept: REHABILITATION | Facility: HOSPITAL | Age: 60
End: 2025-01-10
Payer: COMMERCIAL

## 2025-01-10 DIAGNOSIS — M62.81 MUSCLE WEAKNESS: ICD-10-CM

## 2025-01-10 DIAGNOSIS — M62.461 CONTRACTURE OF MUSCLE OF RIGHT LOWER EXTREMITY: Primary | ICD-10-CM

## 2025-01-10 DIAGNOSIS — M25.671 IMPAIRED RANGE OF MOTION OF RIGHT ANKLE: ICD-10-CM

## 2025-01-10 PROCEDURE — 97530 THERAPEUTIC ACTIVITIES: CPT | Mod: PN

## 2025-01-10 PROCEDURE — 97161 PT EVAL LOW COMPLEX 20 MIN: CPT | Mod: PN

## 2025-01-10 NOTE — PLAN OF CARE
[facility]   Physical Therapy Initial Evaluation     Date: 1/10/2025   Name: Rut Hurtado  Clinic Number: [id number]    Therapy Diagnosis:   Encounter Diagnoses   Name Primary?    Contracture of muscle of right lower extremity Yes    Impaired range of motion of right ankle     Muscle weakness      Physician: Vladislav Sanchez DPM    Physician Orders: PT Eval and Treat   Medical Diagnosis from Referral:   M62.461 (ICD-10-CM) - Gastrocnemius equinus of right lower extremity   M77.41 (ICD-10-CM) - Metatarsalgia of right foot   M79.661 (ICD-10-CM) - Right calf pain     Evaluation Date: 1/10/2025  Authorization Period Expiration: 1/10/26  Plan of Care Expiration: 3/15/25  Visit # / Visits authorized: 1/ 1   (POC 1/12)   FOTO Due visit 5  FOTO Due visit 10    Precautions: Standard  Insurance: Payor: [employer] / Plan: [employer] [place] PPO / Product Type: PPO /     Time In: 130  Time Out: 230  Total Appointment Time (timed & untimed codes): 60 minutes      SUBJECTIVE   Date of onset: Months     History of current condition - Rut reports: pain in her right foot and ankle.  Bottom of her foot hurts, was in night splint, did have a fracture to lateral aspect of her ankle. Tingling in her foot.  Hurts with weight on her foot. No treatments so far other than night splint and orthotics.  DO help some.  Does have some Glute and Hip Pain on her right side.      Falls: None     Imaging, none:     Prior Therapy: None   Social History:  lives with their family  Occupation:   Prior Level of Function: Independent   Current Level of Function: Independent     Pain:  Current 5/10, worst 8/10, best 4/10   Location: right ankles and Foot     Description: Aching, Throbbing, Tingling, and Sharp  Aggravating Factors: Standing and Walking  Easing Factors: rest    Patients goals: Symptom Relief      Medical History:   No past medical history on file.    Surgical History:   Rut Hurtado  has a past surgical history  that includes Hysterectomy and Colonoscopy (N/A, 11/08/2022).    Medications:   Rut has a current medication list which includes the following prescription(s): ascorbic acid (vitamin c), co-enzyme q-10, ergocalciferol, estradiol, msm, niacin, ondansetron, pantoprazole, and rosuvastatin.    Allergies:   Review of patient's allergies indicates:  No Known Allergies       OBJECTIVE     Observation: No bruising or swelling  Posture:   Palpation: Lateral ankle , Achilles discomfort   Sensation: Intact   DTRs: NT   Range of Motion/Strength:      ROM: Left Right   Ankle Dorsiflexion Wnl°  3°   Ankle Plantarflexion Wnl° 35°     Ankle Inversion Wnl° 25°   Ankle Eversion Wnl° 15°       MMT: Left Right   Ankle Dorsiflexion 5/5 4+/5   Ankle Plantarflexion 5/5 4/5   Ankle Inversion 5/5 4/5   Ankle Eversion 5/5 4/5       Flexibility Left Right   Hamstrings ° -20°   Achilles ° -12°     Gait Without AD   Analysis Antalgic           Limitation/Restriction for FOTO LEFS Survey    Therapist reviewed FOTO scores for Rut Hurtado on 1/10/2025.   FOTO documents entered into EPIC - see Media section.    Limitation Score: 43%             TREATMENT     Total Treatment time (time-based codes) separate from Evaluation: 15 minutes      Rut received the treatments listed below:      NEUROMUSCULAR RE-EDUCATION ACTIVITIES to improve Balance, Coordination, Kinesthetic, Sense, Proprioception, and Posture for 15 minutes.  The following were included:  .     Ankle 4 way Red TheraBand   Wobble Board Multi Direction  +Towel Scrunch  Calf Stretch Strap   DorsiFlexion on Step   +Seated heel Raise and Toe Raise   + Bike or Nu Step   +Bridges   + Side Clams   + Step Ups Forward \+ Step Ups Lateral       PATIENT EDUCATION AND HOME EXERCISES     Education provided:   - Home Exercise Program     Written Home Exercises Provided: Patient instructed to cont prior HEP. Exercises were reviewed and Rut was able to demonstrate them prior to the end of the session.   Rut demonstrated poor understanding of the education provided. See EMR under Patient Instructions for exercises provided during therapy sessions.    ASSESSMENT     Rut is a 59 y.o. female referred to outpatient Physical Therapy with a medical diagnosis of Foot pain . Patient presents with Impaired motion ankle, lack of calf flexibility and weakness in her Lower Extremity . Will benefit from therapy     Patient prognosis is Fair.   Patientt will benefit from skilled outpatient Physical Therapy to address the deficits stated above and in the chart below, provide patient /family education, and to maximize patientt's level of independence.     Plan of care discussed with patient: Yes  Patient's spiritual, cultural and educational needs considered and patient is agreeable to the plan of care and goals as stated below:     Anticipated Barriers for therapy: Chronic     Medical Necessity is demonstrated by the following  History  Co-morbidities and personal factors that may impact the plan of care [x] LOW: no personal factors / co-morbidities  [] MODERATE: 1-2 personal factors / co-morbidities  [] HIGH: 3+ personal factors / co-morbidities    Moderate / High Support Documentation:      Examination  Body Structures and Functions, activity limitations and participation restrictions that may impact the plan of care [x] LOW: addressing 1-2 elements  [] MODERATE: 3+ elements  [] HIGH: 4+ elements (please support below)    Moderate / High Support Documentation:      Clinical Presentation [x] LOW: stable  [] MODERATE: Evolving  [] HIGH: Unstable     Decision Making/ Complexity Score: low           Goals:  SHORT TERM GOALS:  3 weeks  Progress Date met    The patient will begin a written HEP [] Met  [] Not Met  [x] Progressing      Increase ankle dorsiflexion to 8 degrees [] Met  [] Not Met  [x] Progressing     Decrease soft tissue tenderness to 3/10 [] Met  [] Not Met  [x] Progressing       LONG TERM GOALS: 6 weeks  Progress  Date met    The patient will be independent with his HEP for maintenance [] Met  [] Not Met  [x] Progressing     Increase ankle dorsiflexion to 10 degrees [] Met  [] Not Met  [x] Progressing     Decrease FOTO score to 30 % [] Met  [] Not Met  [x] Progressing     Decrease soft tissue tenderness to 1/10 [] Met  [] Not Met  [x] Progressing     Walk without foot pain [] Met  [] Not Met  [x] Progressing      [] Met  [] Not Met  [] Progressing      [] Met  [] Not Met  [] Progressing       PLAN   Plan of care Certification: 1/10/2025 to 3/15/25.    Outpatient Physical Therapy 2 times weekly for 6 weeks to include the following interventions: Gait Training, Manual Therapy, Moist Heat/ Ice, Neuromuscular Re-ed, Patient Education, Therapeutic Activities, and Therapeutic Exercise.     Steven Matthews, PT      I CERTIFY THE NEED FOR THESE SERVICES FURNISHED UNDER THIS PLAN OF TREATMENT AND WHILE UNDER MY CARE   Physician's comments:     Physician's Signature: ___________________________________________________

## 2025-01-13 ENCOUNTER — CLINICAL SUPPORT (OUTPATIENT)
Dept: REHABILITATION | Facility: HOSPITAL | Age: 60
End: 2025-01-13
Payer: COMMERCIAL

## 2025-01-13 ENCOUNTER — TELEPHONE (OUTPATIENT)
Dept: FAMILY MEDICINE | Facility: CLINIC | Age: 60
End: 2025-01-13
Payer: COMMERCIAL

## 2025-01-13 ENCOUNTER — OFFICE VISIT (OUTPATIENT)
Dept: OBSTETRICS AND GYNECOLOGY | Facility: CLINIC | Age: 60
End: 2025-01-13
Payer: COMMERCIAL

## 2025-01-13 VITALS
WEIGHT: 164.69 LBS | SYSTOLIC BLOOD PRESSURE: 120 MMHG | HEIGHT: 67 IN | BODY MASS INDEX: 25.85 KG/M2 | DIASTOLIC BLOOD PRESSURE: 74 MMHG

## 2025-01-13 DIAGNOSIS — Z78.0 MENOPAUSE: ICD-10-CM

## 2025-01-13 DIAGNOSIS — Z01.419 WELL WOMAN EXAM WITH ROUTINE GYNECOLOGICAL EXAM: Primary | ICD-10-CM

## 2025-01-13 DIAGNOSIS — M62.89 PELVIC FLOOR DYSFUNCTION: Primary | ICD-10-CM

## 2025-01-13 DIAGNOSIS — Z12.31 ENCOUNTER FOR SCREENING MAMMOGRAM FOR MALIGNANT NEOPLASM OF BREAST: ICD-10-CM

## 2025-01-13 PROCEDURE — 99396 PREV VISIT EST AGE 40-64: CPT | Mod: S$GLB,,, | Performed by: OBSTETRICS & GYNECOLOGY

## 2025-01-13 PROCEDURE — 3078F DIAST BP <80 MM HG: CPT | Mod: CPTII,S$GLB,, | Performed by: OBSTETRICS & GYNECOLOGY

## 2025-01-13 PROCEDURE — 3008F BODY MASS INDEX DOCD: CPT | Mod: CPTII,S$GLB,, | Performed by: OBSTETRICS & GYNECOLOGY

## 2025-01-13 PROCEDURE — 99999 PR PBB SHADOW E&M-EST. PATIENT-LVL III: CPT | Mod: PBBFAC,,, | Performed by: OBSTETRICS & GYNECOLOGY

## 2025-01-13 PROCEDURE — 97112 NEUROMUSCULAR REEDUCATION: CPT | Mod: PO

## 2025-01-13 PROCEDURE — 3074F SYST BP LT 130 MM HG: CPT | Mod: CPTII,S$GLB,, | Performed by: OBSTETRICS & GYNECOLOGY

## 2025-01-13 RX ORDER — ESTRADIOL 1 MG/1
1 TABLET ORAL DAILY
Qty: 90 TABLET | Refills: 3 | Status: SHIPPED | OUTPATIENT
Start: 2025-01-13 | End: 2026-01-13

## 2025-01-13 NOTE — PROGRESS NOTES
Pelvic Health Physical Therapy   Treatment Note     Name: Rut Hurtado  Clinic Number: [id number]    Therapy Diagnosis:   Encounter Diagnosis   Name Primary?    Pelvic floor dysfunction Yes     Physician: Morenita De Guzman MD    Visit Date: 1/13/2025    Physician Orders: PT Eval and Treat   Medical Diagnosis from Referral: Pelvic floor dysfunction [M62.89]   Evaluation Date: 12/12/2024  Plan of Care Expiration: 3/12/2025  Visit # / Visits authorized: 3 total      FOTO 1 /3 on 12/12/2024        Time In: 1130  Time Out: 1215  Total Appointment Time (timed & untimed codes): 45 minutes     Precautions: universal     Subjective     Pt reports: doing well, bladder is better (hasn't sneezed recently or done any movements that cause increased IAP) so no leakage. Bowels are a little better.   Dislocated her right shoulder while she was out of town visiting family   She was compliant with home exercise program.  Response to previous treatment: tolerated well   Functional change: ongoing     Pain: 0/10  Location: not applicable     Objective     Chaperone:  present       Rut participated in neuromuscular re-education activities for 45 minutes including:  Single knee to chest stretch with deep breathing   Child's pose with deep breathing   Seated child's pose with deep breathing     Kegels - long holds/quick squeezes   Diaphragmatic breathing     Rut participated in dynamic functional therapeutic activities to improve functional performance for 00 minutes, including:  Plan of care review, discussion on prognosis        Home Exercises Provided and Patient Education Provided     Education provided:   - anatomy/physiology of pelvic floor, diaphragmatic breathing, kegels, fluid intake/dietary modifications, and behavior modifications  Discussed progression of plan of care with patient; educated pt in activity modification; reviewed HEP with pt. Pt demonstrated and verbalized understanding of all instruction and was  provided with a handout of HEP (see Patient Instructions).      Written Home Exercises Provided: yes.  Exercises were reviewed and Rut was able to demonstrate them prior to the end of the session.  Rut demonstrated good  understanding of the education provided.     See EMR under Patient Instructions for exercises provided 12/18/2024.    Assessment     Progressed down-training to include stretches for PF/hip Mm without aggravation of right shoulder. Mild tension in bilateral rectus noted with breathing assessment. Updated home exercise program.     Rut Is progressing well towards her goals.   Pt prognosis is Good.     Pt will continue to benefit from skilled outpatient physical therapy to address the deficits listed in the problem list box on initial evaluation, provide pt/family education and to maximize pt's level of independence in the home and community environment.     Pt's spiritual, cultural and educational needs considered and pt agreeable to plan of care and goals.     Anticipated barriers to physical therapy: none     Goals:  Short Term Goals: 6 weeks   - Pt will demonstrate excellent knowledge and adherence to HEP to facilitate optimal recovery.  - Pt will demonstrate proper PFM contraction, relaxation, and lengthening coordinated with TA and breath for improved muscle coordination needed for functional activity.     Long Term Goals: 12 weeks   - Pt will demonstrate excellent knowledge and adherence to HEP for continued self-maintenance of symptoms.  - Pt will report FOTO score of 10% improvement or more indicating clinically relevant increase in function.  - Pt will report voiding interval of 2-3 hours for improved ADL tolerance.  PROGRESSING - working on going to the bathroom more often, still doesn't like the bathroom.   - Pt will report ability to delay urinary urge for at least 15 minutes to maintain continence with ADL/IADLs.   PROGRESSING   - Pt will report no incidence of urinary incontinence  7/7 days for improved hygiene and ADL/IADL tolerance.   ACHIEVED - no sneezes in the last week, so no leakage; also hasn't done any movements where she has increased IAP   - Pt will demonstrate PFM strength of at least 3/5 MMT for improved strength needed to maintain continence.   - Pt will report bearing down appropriately 100% of the time for improved bowel function and decreased stress on adjacent pelvic structures.   PROGRESSING - taking medication and using stool.   - Pt will demonstrate independence with pressure-management strategies to decreased stress on adjacent pelvic structures.    Plan     Cont per plan of care     Tram Magaña, PT

## 2025-01-13 NOTE — TELEPHONE ENCOUNTER
----- Message from Anahi sent at 1/13/2025  3:44 PM CST -----  Type:  Patient Returning Call    Who Called:  pt  Who Left Message for Patient:  elsi feldman   Does the patient know what this is regarding?:  yes   Best Call Back Number:  [phone]  Additional Information:  Please call back to advise. Thanks.

## 2025-01-13 NOTE — TELEPHONE ENCOUNTER
Spoke with Mrs. Hurtado via telephone. Mrs. Hurtado is wondering if Dr. Woody could place a referral for physical therapy or orthopedics for her shoulder. Mrs. Hurtado has an appointment scheduled for 1/17/25 with AICHA Childress in case she needs an appointment for a referral.

## 2025-01-13 NOTE — PROGRESS NOTES
"[facility] Note    Pertinent Med & GYN Problem List    Hx JULIEN with removal of one ovary    SUBJECTIVE     Chief Complaint   Patient presents with    Annual Exam       History and Physical:  No LMP recorded. Patient has had a hysterectomy.    HRT: Estrace 1 mg PO    Date: 2025    Rut Hurtado is a 59 y.o.  who presents today for her routine annual GYN exam.     The patient has no Gynecology complaints today.    Pap smear history:  No history of abnormal Pap smears.    Colon cancer screening:  Up-to-date 2022    Family history:  Breast cancer:  Negative   Colon cancer: POSITIVE - Mother  Gyn related cancer:  Negative     Allergies: Review of patient's allergies indicates:  No Known Allergies    History reviewed. No pertinent past medical history.    Past Surgical History:   Procedure Laterality Date    COLONOSCOPY N/A 2022    Procedure: COLONOSCOPY;  Surgeon: Tracy Calix MD;  Location: [facility];  Service: Endoscopy;  Laterality: N/A;    HYSTERECTOMY      JULIEN with removal "1 ovary"       MEDS:   Current Outpatient Medications:     ascorbic acid, vitamin C, (VITAMIN C) 1000 MG tablet, Take 1,000 mg by mouth once daily., Disp: , Rfl:     estradioL (ESTRACE) 1 MG tablet, Take 1 tablet (1 mg total) by mouth once daily., Disp: 90 tablet, Rfl: 3    LINZESS 72 mcg Cap capsule, Take 72 mcg by mouth., Disp: , Rfl:      OB History          5    Para   4    Term   3       1    AB   1    Living   4         SAB   1    IAB        Ectopic        Multiple        Live Births   4           Obstetric Comments   Vaginal delivery x 4  SAB x 1               Age of Menarche:12  Age at first pregnancy:    Age at first live birth:18  Number of months breastfeeding:    Age at Menopause:51   Comments:       Social History     Tobacco Use    Smoking status: Never    Smokeless tobacco: Never   Substance Use Topics    Alcohol use: No    Drug use: Never       Family History " "  Problem Relation Name Age of Onset    Colon cancer Mother      Cancer Mother          colon       Past medical and surgical history reviewed.   I have reviewed the patient's medical history in detail and updated the computerized patient record.    Review of Systems (at today's evaluation)  Review of Systems   Constitutional:  Negative for fever and unexpected weight change.   HENT:  Negative for congestion, ear pain, nosebleeds, sinus pain and sore throat.    Eyes: Negative.    Respiratory:  Negative for cough and shortness of breath.    Cardiovascular:  Negative for chest pain and palpitations.   Gastrointestinal:  Negative for constipation, diarrhea, nausea and vomiting.   Endocrine: Negative.    Genitourinary:  Negative for dysuria, frequency, hematuria and urgency.        Gyn as per HPI   Musculoskeletal:  Negative for arthralgias and myalgias.   Skin:  Negative for rash.   Neurological:  Negative for weakness and headaches.   Psychiatric/Behavioral:  The patient is not nervous/anxious.         Physical Exam:   /74 (BP Location: Left arm, Patient Position: Sitting)   Ht 5' 7" (1.702 m)   Wt 74.7 kg (164 lb 10.9 oz)   BMI 25.79 kg/m²     Physical Exam:   Constitutional: She appears well-developed and well-nourished.    HENT:   Head: Normocephalic.     Neck: No thyroid mass present.    Cardiovascular:  Normal rate, regular rhythm and normal heart sounds.             Pulmonary/Chest: Effort normal and breath sounds normal. Right breast exhibits no mass, no nipple discharge, no skin change and no tenderness. Left breast exhibits no mass, no nipple discharge, no skin change and no tenderness.   No right or left axillary adenopathy.        Abdominal: Soft. There is no abdominal tenderness.     Genitourinary:    Inguinal canal, vagina, right adnexa and left adnexa normal.      Pelvic exam was performed with patient supine.   The external female genitalia was normal.   No external genitalia lesions " identified,Right adnexum displays no tenderness and no fullness. Left adnexum displays no tenderness and no fullness. No tenderness or bleeding in the vagina. Cervix is absent.Uterus is absent. Normal urethral meatus.Urethra findings: no tendernessBladder findings: no bladder tenderness   Genitourinary Comments: A chaperone (female medical assistant) was present throughout the physical exam.             Musculoskeletal:      Right lower leg: No edema.      Left lower leg: No edema.      Lymphadenopathy:     She has no cervical adenopathy. No inguinal adenopathy noted on the right or left side.    Neurological: She is alert.    Skin: Skin is warm and dry.    Psychiatric: She has a normal mood and affect. Mood normal.        Assessment:        1. Well woman exam with routine gynecological exam    2. Menopause    3. Encounter for screening mammogram for malignant neoplasm of breast         Plan:      Well woman exam with routine gynecological exam    Menopause  -     estradioL (ESTRACE) 1 MG tablet; Take 1 tablet (1 mg total) by mouth once daily.  Dispense: 90 tablet; Refill: 3    Encounter for screening mammogram for malignant neoplasm of breast  -     Mammo Digital Screening Bilat w/ [facility]; Future; Expected date: 01/13/2025       Follow up for as needed / for any GYN related issues.     The above was reviewed and discussed with the patient.    Annual exam and screening issues based on the patient's age, medical history and family history were reviewed / discussed.  Routine health maintenance issues were reviewed and discussed.      The patient's current HRT was discussed.  A refill was provided.  The pros, cons, risks, benefits, alternatives and indications of the medication(s) prescribed, as well as appropriate use and potential side effects were discussed.  Oncologic and cardiovascular issues associated hormone replacement therapy were discussed.    From a gynecologic standpoint the patient is currently doing well  without complaints.    The patient's questions were answered, and she is in agreement with the current plan.     Vladimir Justice MD  Department OBGYN [facility]

## 2025-01-13 NOTE — PATIENT INSTRUCTIONS
"Home Exercise Program: 01/13/2025    DIAPHRAGMATIC BREATHING     The diaphragm is a dome shaped muscle that forms the floor of the rib cage. It is the most efficient muscle for breathing and relaxation, although most people are not used to using the diaphragm. Diaphragmatic or belly breathing is an important technique to learn because it helps settle down or relax the autonomic nervous system. The correct use of diaphragmatic breathing can help to quiet brain activity resulting in the relaxation of all the muscles and organs of the body. This is accomplished by slow rhythmic breathing concentrated in the diaphragm muscle rather than the chest.    How to do proper relaxation breathing:    Start by lying on your back or reclining in a chair in a relaxed position. Place one hand on your chest and the other on your abdomen.  Relax your jaw by placing your tongue on the floor of your mouth and keeping your teeth slightly apart.   Take a deep breath in, letting the abdomen expand and rise while you keep your upper chest, neck and shoulders relaxed.   As you breathe out, allow your abdomen and chest to fall. Exhale completely.  It doesn't matter if you breathe in/out through your nose and/or mouth. Do whichever feels comfortable.  Remember to breathe slowly.  Do not force your breathing. Do not hold your breath.  Repeat daily while doing stretches listed below:         "single knee to chest" - lay on your back, use your hands to pull your left knee to your chest, keeping the right leg straight on the bed. Hold this pose while you incorporate your diaphragmatic breathing. Repeat on the opposite side. Complete 10-20 breaths on both legs.     __________________________________________________________         "Child's Pose" - first start by getting onto your hands and knees, then move your feet so they are touching and your knees are wider than your hips. Sit back so that you are sitting on your heels and reach your arms " forward. Think about resting in this position. Complete 10-20 breaths.     CHILD'S POSE VARIATION:          __________________________________________________________         Seated Child's Pose - sit with knees and feet spread wide. Next, either lean forward to rest elbows on your knees (left) or resting your head and chest against a cushion (right). Think about resting in this position. Complete 10-20 breaths.

## 2025-01-13 NOTE — TELEPHONE ENCOUNTER
----- Message from Tammy sent at 1/13/2025  1:35 PM CST -----  Regarding: Appointment needed  Hi this pt has asked for someone to reach out to them for an appointment. She said that while out of town she dislocated her shoulder and the ER when she went told her to follow up with her primary.

## 2025-01-14 ENCOUNTER — CLINICAL SUPPORT (OUTPATIENT)
Dept: REHABILITATION | Facility: HOSPITAL | Age: 60
End: 2025-01-14
Payer: COMMERCIAL

## 2025-01-14 DIAGNOSIS — M25.671 IMPAIRED RANGE OF MOTION OF RIGHT ANKLE: Primary | ICD-10-CM

## 2025-01-14 DIAGNOSIS — M62.81 MUSCLE WEAKNESS: ICD-10-CM

## 2025-01-14 PROCEDURE — 97112 NEUROMUSCULAR REEDUCATION: CPT | Mod: PN,CQ

## 2025-01-14 PROCEDURE — 97530 THERAPEUTIC ACTIVITIES: CPT | Mod: PN,CQ

## 2025-01-14 PROCEDURE — 97110 THERAPEUTIC EXERCISES: CPT | Mod: PN,CQ

## 2025-01-14 NOTE — PROGRESS NOTES
[facility]   Physical Therapy Treatment Note      Name: Rut Hurtado  Clinic Number: [id number]    Therapy Diagnosis:   Encounter Diagnoses   Name Primary?    Impaired range of motion of right ankle Yes    Muscle weakness      Physician: Vladislav Sanchez DPM    Visit Date: 1/14/2025    Physician Orders: PT Eval and Treat   Medical Diagnosis from Referral:   M62.461 (ICD-10-CM) - Gastrocnemius equinus of right lower extremity   M77.41 (ICD-10-CM) - Metatarsalgia of right foot   M79.661 (ICD-10-CM) - Right calf pain      Evaluation Date: 1/10/2025  Authorization Period Expiration: 1/10/26  Plan of Care Expiration: 3/15/25  Visit # / Visits authorized: 1/ 20   (POC 1/12)   FOTO Due visit 5  FOTO Due visit 10     Precautions: Standard  Insurance: Payor: [employer] / Plan: [employer] [place] PPO / Product Type: PPO /      Time In: 100  Time Out: 200  Total Appointment Time (timed & untimed codes): 60 minutes     PTA Visit #: 1/5       Subjective     Patient reports: right leg gives her some trouble, but alright.   She was compliant with home exercise program.  Response to previous treatment: no issues   Functional change: ongoing     Pain: 1/10  Location: right ankles and leg      Objective      Objective Measures updated at progress report unless specified.     Treatment     Rut received the treatments listed below:      therapeutic exercises to develop strength and ROM for 10 minutes including:    Bike or Nu Step x 10 minutes     NEUROMUSCULAR RE-EDUCATION ACTIVITIES to improve Balance, Coordination, Kinesthetic, Sense, Proprioception, and Posture for 35 minutes.  The following were included:  .      Ankle 4 way Red TheraBand x 30 each   Wobble Board Multi Direction x 30   Towel Scrunch x 30   Calf Stretch Strap 3 x 30 sec hold   DorsiFlexion on Step 20 x 5 sec hold     Bridges x 30   Side Clams Red Theraband x 30     therapeutic activities to improve functional performance for 8  minutes, including:    Step Ups Forward x 30   Step Ups Lateral x 30    Seated heel Raise and Toe Raise x 30 each       Patient Education and Home Exercises       Education provided:   - home exercises, activity tolerance     Written Home Exercises Provided: Pt instructed to continue prior HEP. Exercises were reviewed and Rut was able to demonstrate them prior to the end of the session.  Rut demonstrated good  understanding of the education provided. See Electronic Medical Record under Patient Instructions for exercises provided during therapy sessions    Assessment     Patient performed exercises well with no adverse effects reported. Noted Right Lower Extremity weakness. Progress as able.     Rut Is progressing well towards her goals.   Patient prognosis is Good.     Patient will continue to benefit from skilled outpatient physical therapy to address the deficits listed in the problem list box on initial evaluation, provide pt/family education and to maximize pt's level of independence in the home and community environment.     Patient's spiritual, cultural and educational needs considered and pt agreeable to plan of care and goals.     Anticipated barriers to physical therapy: none     Goals:   SHORT TERM GOALS:  3 weeks  Progress Date met    The patient will begin a written HEP [] Met  [] Not Met  [x] Progressing      Increase ankle dorsiflexion to 8 degrees [] Met  [] Not Met  [x] Progressing     Decrease soft tissue tenderness to 3/10 [] Met  [] Not Met  [x] Progressing        LONG TERM GOALS: 6 weeks  Progress Date met    The patient will be independent with his HEP for maintenance [] Met  [] Not Met  [x] Progressing     Increase ankle dorsiflexion to 10 degrees [] Met  [] Not Met  [x] Progressing     Decrease FOTO score to 30 % [] Met  [] Not Met  [x] Progressing     Decrease soft tissue tenderness to 1/10 [] Met  [] Not Met  [x] Progressing     Walk without foot pain [] Met  [] Not Met  [x] Progressing                       Plan     Continue per Plan of Care     Luisa Longoria, PTA

## 2025-01-14 NOTE — TELEPHONE ENCOUNTER
Left message for patient to return call in regards to Mrs. Hurtado needing an appointment in order for a referral to be placed for physical therapy.

## 2025-01-17 ENCOUNTER — OFFICE VISIT (OUTPATIENT)
Dept: FAMILY MEDICINE | Facility: CLINIC | Age: 60
End: 2025-01-17
Payer: COMMERCIAL

## 2025-01-17 ENCOUNTER — CLINICAL SUPPORT (OUTPATIENT)
Dept: REHABILITATION | Facility: HOSPITAL | Age: 60
End: 2025-01-17
Payer: COMMERCIAL

## 2025-01-17 VITALS
DIASTOLIC BLOOD PRESSURE: 70 MMHG | SYSTOLIC BLOOD PRESSURE: 130 MMHG | WEIGHT: 166.69 LBS | RESPIRATION RATE: 18 BRPM | TEMPERATURE: 99 F | OXYGEN SATURATION: 99 % | BODY MASS INDEX: 26.16 KG/M2 | HEART RATE: 80 BPM | HEIGHT: 67 IN

## 2025-01-17 DIAGNOSIS — K59.00 CONSTIPATION, UNSPECIFIED CONSTIPATION TYPE: ICD-10-CM

## 2025-01-17 DIAGNOSIS — R22.31 SKIN LUMP OF ARM, RIGHT: ICD-10-CM

## 2025-01-17 DIAGNOSIS — S43.014S CLOSED ANTERIOR DISLOCATION OF RIGHT SHOULDER, SEQUELA: Primary | ICD-10-CM

## 2025-01-17 DIAGNOSIS — M62.81 MUSCLE WEAKNESS: ICD-10-CM

## 2025-01-17 DIAGNOSIS — M25.671 IMPAIRED RANGE OF MOTION OF RIGHT ANKLE: Primary | ICD-10-CM

## 2025-01-17 PROBLEM — S43.014A CLOSED ANTERIOR DISLOCATION OF RIGHT SHOULDER: Status: ACTIVE | Noted: 2025-01-17

## 2025-01-17 PROBLEM — E78.2 MIXED HYPERLIPIDEMIA: Status: ACTIVE | Noted: 2019-12-16

## 2025-01-17 PROCEDURE — 97110 THERAPEUTIC EXERCISES: CPT | Mod: PN,CQ

## 2025-01-17 PROCEDURE — 99999 PR PBB SHADOW E&M-EST. PATIENT-LVL IV: CPT | Mod: PBBFAC,,, | Performed by: PHYSICIAN ASSISTANT

## 2025-01-17 PROCEDURE — 99214 OFFICE O/P EST MOD 30 MIN: CPT | Mod: S$GLB,,, | Performed by: PHYSICIAN ASSISTANT

## 2025-01-17 PROCEDURE — 97530 THERAPEUTIC ACTIVITIES: CPT | Mod: PN,CQ

## 2025-01-17 PROCEDURE — 3008F BODY MASS INDEX DOCD: CPT | Mod: CPTII,S$GLB,, | Performed by: PHYSICIAN ASSISTANT

## 2025-01-17 PROCEDURE — G2211 COMPLEX E/M VISIT ADD ON: HCPCS | Mod: S$GLB,,, | Performed by: PHYSICIAN ASSISTANT

## 2025-01-17 PROCEDURE — 1159F MED LIST DOCD IN RCRD: CPT | Mod: CPTII,S$GLB,, | Performed by: PHYSICIAN ASSISTANT

## 2025-01-17 PROCEDURE — 3078F DIAST BP <80 MM HG: CPT | Mod: CPTII,S$GLB,, | Performed by: PHYSICIAN ASSISTANT

## 2025-01-17 PROCEDURE — 3075F SYST BP GE 130 - 139MM HG: CPT | Mod: CPTII,S$GLB,, | Performed by: PHYSICIAN ASSISTANT

## 2025-01-17 PROCEDURE — 1160F RVW MEDS BY RX/DR IN RCRD: CPT | Mod: CPTII,S$GLB,, | Performed by: PHYSICIAN ASSISTANT

## 2025-01-17 PROCEDURE — 97112 NEUROMUSCULAR REEDUCATION: CPT | Mod: PN,CQ

## 2025-01-17 RX ORDER — LINACLOTIDE 72 UG/1
72 CAPSULE, GELATIN COATED ORAL
COMMUNITY
Start: 2024-12-09

## 2025-01-17 NOTE — ASSESSMENT & PLAN NOTE
Occurred on 12/24/2024.  Patient was seen in the ED for reduction of right anterior shoulder dislocation. She was in a sling for approximately one week following dislocation She is having residual mild pain and range of motion impairement since the dislocation however is gradually improving.  She is interested in physical therapy to work on mobility of the shoulder. She has almost full range of motion but is limited in some directions by pain. Shoulder joint is stable on exam, right arm neurovascularly intact. Sending referral for physical therapy.

## 2025-01-17 NOTE — ASSESSMENT & PLAN NOTE
Patient states has been present for years and has forgotten to mention several times. Present in right deltoid area has not changed in size, consistency in years. Non-tender, no overlying skin changes, rubbery in texture and is minimally raised. Patient not very interested in further intervention but advised to follow up if changes in lump occur such as size increase, increased tenderness, development of skin changes, etc.

## 2025-01-17 NOTE — PROGRESS NOTES
"OFFICE VISIT   1/17/2025    Patient ID: Rut Hurtado is a 59 y.o. female    SUBJECTIVE:  Follow-up (Referral to PT/)      HPI:  Patient presents for follow-up for physical therapy referral.  Patient had an anterior right shoulder dislocation while out of town on 12/24/2024.  Patient was seen in the ED for reduction.  She had stable x-ray following reduction.  She notes some residual pain and stiffness in the right shoulder however has been gradually improving.  She feels that the shoulder is stable in the joint at this time.  She denies any numbness, tingling, color changes or temperature change of the right arm and hand.  Patient was in sling for about 1 week following dislocation.    History reviewed. No pertinent past medical history.    Social History     Tobacco Use    Smoking status: Never    Smokeless tobacco: Never   Substance Use Topics    Alcohol use: No    Drug use: Never       Past Surgical History:   Procedure Laterality Date    COLONOSCOPY N/A 11/08/2022    Procedure: COLONOSCOPY;  Surgeon: Tracy Calix MD;  Location: [facility];  Service: Endoscopy;  Laterality: N/A;    HYSTERECTOMY      JULIEN with removal "1 ovary"       Review of Systems   Eyes: Negative.    Gastrointestinal:  Positive for constipation.   Genitourinary: Negative.    Musculoskeletal:  Positive for arthralgias. Negative for neck pain.   Neurological: Negative.  Negative for weakness and numbness.       OBJECTIVE:    /70 (BP Location: Right arm, Patient Position: Sitting)   Pulse 80   Temp 98.6 °F (37 °C) (Oral)   Resp 18   Ht 5' 7" (1.702 m)   Wt 75.6 kg (166 lb 10.7 oz)   SpO2 99%   BMI 26.10 kg/m²       Current Outpatient Medications:     ascorbic acid, vitamin C, (VITAMIN C) 1000 MG tablet, Take 1,000 mg by mouth once daily., Disp: , Rfl:     estradioL (ESTRACE) 1 MG tablet, Take 1 tablet (1 mg total) by mouth once daily., Disp: 90 tablet, Rfl: 3    LINZESS 72 mcg Cap capsule, Take 72 mcg by mouth., Disp: , Rfl: "     Physical Exam  Constitutional:       General: She is not in acute distress.     Appearance: Normal appearance. She is normal weight. She is not ill-appearing.   HENT:      Head: Normocephalic and atraumatic.      Right Ear: External ear normal.      Left Ear: External ear normal.      Nose: Nose normal.      Mouth/Throat:      Mouth: Mucous membranes are moist.      Pharynx: Oropharynx is clear.   Eyes:      Extraocular Movements: Extraocular movements intact.      Conjunctiva/sclera: Conjunctivae normal.   Cardiovascular:      Rate and Rhythm: Normal rate and regular rhythm.      Pulses: Normal pulses.      Heart sounds: Normal heart sounds.   Pulmonary:      Effort: Pulmonary effort is normal.      Breath sounds: Normal breath sounds.   Abdominal:      General: Abdomen is flat.      Palpations: Abdomen is soft.   Musculoskeletal:         General: No swelling.      Right shoulder: No swelling, deformity, tenderness or crepitus. Decreased range of motion (mild). Normal pulse.      Left shoulder: Normal.      Right hand: Normal strength. Normal capillary refill. Normal pulse.        Arms:       Cervical back: Normal range of motion and neck supple.      Comments: R arm neurovascularly intact   Skin:     General: Skin is warm and dry.   Neurological:      General: No focal deficit present.      Mental Status: She is alert and oriented to person, place, and time. Mental status is at baseline.   Psychiatric:         Mood and Affect: Mood normal.         Behavior: Behavior normal.         Thought Content: Thought content normal.         Judgment: Judgment normal.         Assessment/Plan:    Problem List Items Addressed This Visit          GI    Constipation     Prescribed Linzess 72 mcg by GI which is working well for patient.            Orthopedic    Closed anterior dislocation of right shoulder - Primary     Occurred on 12/24/2024.  Patient was seen in the ED for reduction of right anterior shoulder dislocation.  She was in a sling for approximately one week following dislocation She is having residual mild pain and range of motion impairement since the dislocation however is gradually improving.  She is interested in physical therapy to work on mobility of the shoulder. She has almost full range of motion but is limited in some directions by pain. Shoulder joint is stable on exam, right arm neurovascularly intact. Sending referral for physical therapy.          Relevant Orders    Ambulatory Referral/Consult to Physical Therapy       Other    Skin lump of arm, right     Patient states has been present for years and has forgotten to mention several times. Present in right deltoid area has not changed in size, consistency in years. Non-tender, no overlying skin changes, rubbery in texture and is minimally raised. Patient not very interested in further intervention but advised to follow up if changes in lump occur such as size increase, increased tenderness, development of skin changes, etc.             Patient verbalizes understanding of instructions and healthcare topics reviewed. All of patient's questions were answered.  Patient encouraged to contact office if other questions arise.    Health Maintenance Due   Topic Date Due    TETANUS VACCINE  Never done    High Dose Statin  Never done    Pneumococcal Vaccines (Age 50+) (1 of 1 - PCV) Never done    Influenza Vaccine (1) Never done    COVID-19 Vaccine (3 - 2024-25 season) 09/01/2024    Mammogram  09/29/2024    Shingles Vaccine (2 of 2) 11/26/2024       Follow up in about 8 months (around 9/17/2025), or sooner if needed, for annual wellness visit.    Reviewed complexities inherent to evaluation and management of this patient's acute and chronic problems to deliver optimal long-term care to the patient at time of visit.      Total billable visit time encompassing: documentation, chart review, converting chart data into EPIC EMR, discussing medical issues with patient  face-to-face, placing orders, refilling medications, research patient-specific resource material, and arranging follow-up total: 34 minutes    Ana Maria Childress PA-C  Family Medicine Physician Assistant

## 2025-01-17 NOTE — PROGRESS NOTES
[facility]   Physical Therapy Treatment Note      Name: Rut Hurtado  Clinic Number: [id number]    Therapy Diagnosis:   Encounter Diagnoses   Name Primary?    Impaired range of motion of right ankle Yes    Muscle weakness      Physician: Vladislav Sanchez DPM    Visit Date: 1/17/2025    Physician Orders: PT Eval and Treat   Medical Diagnosis from Referral:   M62.461 (ICD-10-CM) - Gastrocnemius equinus of right lower extremity   M77.41 (ICD-10-CM) - Metatarsalgia of right foot   M79.661 (ICD-10-CM) - Right calf pain      Evaluation Date: 1/10/2025  Authorization Period Expiration: 1/10/26  Plan of Care Expiration: 3/15/25  Visit # / Visits authorized: 2/ 20   (POC 2/12)   FOTO Due visit 5  FOTO Due visit 10     Precautions: Standard  Insurance: Payor: [employer] / Plan: [place] [place] PPO / Product Type: PPO /      Time In: 130  Time Out: 230  Total Appointment Time (timed & untimed codes): 60 minutes     PTA Visit #: 2/5       Subjective     Patient reports: leg is doing alright, her shoulder is bothering her some.   She was compliant with home exercise program.  Response to previous treatment: no issues   Functional change: ongoing     Pain: 1/10  Location: right ankles and leg      Objective      Objective Measures updated at progress report unless specified.     Treatment     Rut received the treatments listed below:      therapeutic exercises to develop strength and ROM for 10 minutes including:    Bike or Nu Step x 10 minutes     NEUROMUSCULAR RE-EDUCATION ACTIVITIES to improve Balance, Coordination, Kinesthetic, Sense, Proprioception, and Posture for 35 minutes.  The following were included:  .      Ankle 4 way Red TheraBand x 30 each   Wobble Board Multi Direction x 30   Towel Scrunch x 30   Calf Stretch Strap 3 x 30 sec hold   DorsiFlexion on Step 20 x 5 sec hold     Bridges x 30   Side Clams Red Theraband x 30     therapeutic activities to improve functional performance  for 8 minutes, including:    Step Ups Forward x 30   Step Ups Lateral x 30    Seated heel Raise and Toe Raise x 30 each       Patient Education and Home Exercises       Education provided:   - home exercises, activity tolerance     Written Home Exercises Provided: Pt instructed to continue prior HEP. Exercises were reviewed and Rut was able to demonstrate them prior to the end of the session.  Rut demonstrated good  understanding of the education provided. See Electronic Medical Record under Patient Instructions for exercises provided during therapy sessions    Assessment     Patient performed exercises well with no adverse effects reported. Noted Right Lower Extremity weakness, but reports less tightness when leaving session. Encouraged to perform home exercises that were given today to improve carry over.  Progress as able.     Rut Is progressing well towards her goals.   Patient prognosis is Good.     Patient will continue to benefit from skilled outpatient physical therapy to address the deficits listed in the problem list box on initial evaluation, provide pt/family education and to maximize pt's level of independence in the home and community environment.     Patient's spiritual, cultural and educational needs considered and pt agreeable to plan of care and goals.     Anticipated barriers to physical therapy: none     Goals:   SHORT TERM GOALS:  3 weeks  Progress Date met    The patient will begin a written HEP [] Met  [] Not Met  [x] Progressing      Increase ankle dorsiflexion to 8 degrees [] Met  [] Not Met  [x] Progressing     Decrease soft tissue tenderness to 3/10 [] Met  [] Not Met  [x] Progressing        LONG TERM GOALS: 6 weeks  Progress Date met    The patient will be independent with his HEP for maintenance [] Met  [] Not Met  [x] Progressing     Increase ankle dorsiflexion to 10 degrees [] Met  [] Not Met  [x] Progressing     Decrease FOTO score to 30 % [] Met  [] Not Met  [x] Progressing      Decrease soft tissue tenderness to 1/10 [] Met  [] Not Met  [x] Progressing     Walk without foot pain [] Met  [] Not Met  [x] Progressing                      Plan     Continue per Plan of Care     Luisa Longoria, PTA

## 2025-01-19 ENCOUNTER — PATIENT MESSAGE (OUTPATIENT)
Dept: REHABILITATION | Facility: HOSPITAL | Age: 60
End: 2025-01-19
Payer: COMMERCIAL

## 2025-01-24 ENCOUNTER — CLINICAL SUPPORT (OUTPATIENT)
Dept: REHABILITATION | Facility: HOSPITAL | Age: 60
End: 2025-01-24
Payer: COMMERCIAL

## 2025-01-24 DIAGNOSIS — M25.671 IMPAIRED RANGE OF MOTION OF RIGHT ANKLE: Primary | ICD-10-CM

## 2025-01-24 DIAGNOSIS — M62.81 MUSCLE WEAKNESS: ICD-10-CM

## 2025-01-24 PROCEDURE — 97530 THERAPEUTIC ACTIVITIES: CPT | Mod: PN,CQ

## 2025-01-24 PROCEDURE — 97112 NEUROMUSCULAR REEDUCATION: CPT | Mod: PN,CQ

## 2025-01-24 PROCEDURE — 97110 THERAPEUTIC EXERCISES: CPT | Mod: PN,CQ

## 2025-01-24 NOTE — PROGRESS NOTES
[facility]   Physical Therapy Treatment Note      Name: Rut Hurtado  [facility] Number: [id number]    Therapy Diagnosis:   Encounter Diagnoses   Name Primary?    Impaired range of motion of right ankle Yes    Muscle weakness      Physician: Vladislav Sanchez DPM    Visit Date: 1/24/2025    Physician Orders: PT Eval and Treat   Medical Diagnosis from Referral:   M62.461 (ICD-10-CM) - Gastrocnemius equinus of right lower extremity   M77.41 (ICD-10-CM) - Metatarsalgia of right foot   M79.661 (ICD-10-CM) - Right calf pain      Evaluation Date: 1/10/2025  Authorization Period Expiration: 1/10/26  Plan of Care Expiration: 3/15/25  Visit # / Visits authorized: 3/ 20   (POC 3/12)   FOTO Due visit 5  FOTO Due visit 10     Precautions: Standard  Insurance: Payor: [employer] / Plan: [place] PPO / Product Type: PPO /      Time In: 1230  Time Out: 130  Total Appointment Time (timed & untimed codes): 60 minutes     PTA Visit #: 3/5       Subjective     Patient reports: has some pain in the back of her right leg and groin. Shoulder is still bothering her, she should be coming to therapy for that soon.   She was compliant with home exercise program.  Response to previous treatment: no issues   Functional change: ongoing     Pain: 1/10  Location: right ankles and leg      Objective      Objective Measures updated at progress report unless specified.     Treatment     Rut received the treatments listed below:      therapeutic exercises to develop strength and ROM for 10 minutes including:    Bike or Nu Step x 10 minutes     NEUROMUSCULAR RE-EDUCATION ACTIVITIES to improve Balance, Coordination, Kinesthetic, Sense, Proprioception, and Posture for 35 minutes.  The following were included:       Ankle 4 way Red TheraBand x 30 each   Wobble Board Multi Direction x 30   Towel Scrunch x 30   Calf Stretch incline 3 x 30 sec hold   DorsiFlexion on Step 20 x 5 sec hold     Supine Right Lower Extremity  sciatic nerve glide x 20   Bridges Red Theraband x 30   Side Clams Red Theraband x 30 Bilateral   Precor hip abduction 40# x 30     therapeutic activities to improve functional performance for 8 minutes, including:    Step Ups Forward x 30   Step Ups Lateral x 30    Seated heel Raise and Toe Raise x 30 each       Patient Education and Home Exercises       Education provided:   - home exercises, activity tolerance     Written Home Exercises Provided: Pt instructed to continue prior HEP. Exercises were reviewed and Rut was able to demonstrate them prior to the end of the session.  Rut demonstrated good  understanding of the education provided. See Electronic Medical Record under Patient Instructions for exercises provided during therapy sessions    Assessment     Patient performed exercises well with no adverse effects reported. Rut continues to have discomfort in Right Lower Extremity along with her right shoulder. Tightness and weakness evident in right hip during mobility.  Progress as able.     Rut Is progressing well towards her goals.   Patient prognosis is Good.     Patient will continue to benefit from skilled outpatient physical therapy to address the deficits listed in the problem list box on initial evaluation, provide pt/family education and to maximize pt's level of independence in the home and community environment.     Patient's spiritual, cultural and educational needs considered and pt agreeable to plan of care and goals.     Anticipated barriers to physical therapy: none     Goals:   SHORT TERM GOALS:  3 weeks  Progress Date met    The patient will begin a written HEP [] Met  [] Not Met  [x] Progressing      Increase ankle dorsiflexion to 8 degrees [] Met  [] Not Met  [x] Progressing     Decrease soft tissue tenderness to 3/10 [] Met  [] Not Met  [x] Progressing        LONG TERM GOALS: 6 weeks  Progress Date met    The patient will be independent with his HEP for maintenance [] Met  [] Not  Met  [x] Progressing     Increase ankle dorsiflexion to 10 degrees [] Met  [] Not Met  [x] Progressing     Decrease FOTO score to 30 % [] Met  [] Not Met  [x] Progressing     Decrease soft tissue tenderness to 1/10 [] Met  [] Not Met  [x] Progressing     Walk without foot pain [] Met  [] Not Met  [x] Progressing                      Plan     Continue per Plan of Care     Luisa Longoria, PTA

## 2025-01-27 ENCOUNTER — CLINICAL SUPPORT (OUTPATIENT)
Dept: REHABILITATION | Facility: HOSPITAL | Age: 60
End: 2025-01-27
Payer: COMMERCIAL

## 2025-01-27 DIAGNOSIS — M62.89 PELVIC FLOOR DYSFUNCTION: Primary | ICD-10-CM

## 2025-01-27 PROCEDURE — 97112 NEUROMUSCULAR REEDUCATION: CPT | Mod: PO

## 2025-01-27 PROCEDURE — 97140 MANUAL THERAPY 1/> REGIONS: CPT | Mod: PO

## 2025-01-27 NOTE — PATIENT INSTRUCTIONS
"      Clamshells   - Start laying on your side, feet together and knees bent. You can add resistance band just above your knees  - Keep your feet together as you rotate your hip to bring your knee up. Don't let your pelvis rock forward.   - Perform 2-3 sets of 12 on each side.          Side-lying Leg Lifts     - Start lying on your side with your bottom knee bent and your top leg straight. Slightly roll your top hip forward.   - Bring your top leg back slightly so that your leg is in a straight line with your trunk.   - Keeping your knee straight, raise your leg up towards the ceiling, then slowly lower back down. You should feel your muscle on the side of your hip/glute leonor.   - Perform 2-3 sets of 12 on each side.            Bridges  Start on your back with knees bent  Squeeze your buttocks and then raise your buttocks off the floor/bed as creating a "Bridge" with your body. Hold for 5 seconds and then lower yourself and repeat.  Repeat 2-3 sets of 12 reps     "

## 2025-01-27 NOTE — PROGRESS NOTES
Pelvic Health Physical Therapy   Treatment Note     Name: Rut Hurtado  Clinic Number: [id number]    Therapy Diagnosis:   Encounter Diagnosis   Name Primary?    Pelvic floor dysfunction Yes     Physician: Morenita De Guzman MD    Visit Date: 1/27/2025    Physician Orders: PT Eval and Treat   Medical Diagnosis from Referral: Pelvic floor dysfunction [M62.89]   Evaluation Date: 12/12/2024  Plan of Care Expiration: 3/12/2025  Visit # / Visits authorized: 3 total      FOTO 1 /3 on 12/12/2024        Time In: 830  Time Out: 915  Total Appointment Time (timed & untimed codes): 45 minutes     Precautions: universal     Subjective     Pt reports: doing better. Able to sneeze yesterday while out for a walk and didn't leak. Better about voiding frequency. Bowels are better if she takes her medication and fiber     She was compliant with home exercise program.  Response to previous treatment: tolerated well   Functional change: ongoing     Pain: 0/10  Location: not applicable     Objective     Chaperone:  present     1/27/2025  Hip mobility reduced on right with pain with ER - improved and pain-free following long axis grade 2-3 mobe.  Hip ER 3+/5      Manual intervetion x10 min  long axis grade 2-3 mobe on right     Rut participated in neuromuscular re-education activities for 35 minutes including:  Side-lying clamshells   Side-lying hip abduction   Supine bridge     NOT TODAY  Single knee to chest stretch with deep breathing   Child's pose with deep breathing   Seated child's pose with deep breathing   Kegels - long holds/quick squeezes   Diaphragmatic breathing     Rut participated in dynamic functional therapeutic activities to improve functional performance for 00 minutes, including:  Plan of care review, discussion on prognosis        Home Exercises Provided and Patient Education Provided     Education provided:   - anatomy/physiology of pelvic floor, diaphragmatic breathing, kegels, fluid intake/dietary  modifications, and behavior modifications  Discussed progression of plan of care with patient; educated pt in activity modification; reviewed HEP with pt. Pt demonstrated and verbalized understanding of all instruction and was provided with a handout of HEP (see Patient Instructions).      Written Home Exercises Provided: yes.  Exercises were reviewed and Rut was able to demonstrate them prior to the end of the session.  Rut demonstrated good  understanding of the education provided.     See EMR under Patient Instructions for exercises provided 12/18/2024.    Assessment     Hip mobility limited on right likely related to right ankle dysfunction. Pain reduced and mobility improved with mobilization. Updated home exercise program.     Rut Is progressing well towards her goals.   Pt prognosis is Good.     Pt will continue to benefit from skilled outpatient physical therapy to address the deficits listed in the problem list box on initial evaluation, provide pt/family education and to maximize pt's level of independence in the home and community environment.     Pt's spiritual, cultural and educational needs considered and pt agreeable to plan of care and goals.     Anticipated barriers to physical therapy: none     Goals:  Short Term Goals: 6 weeks   - Pt will demonstrate excellent knowledge and adherence to HEP to facilitate optimal recovery.  - Pt will demonstrate proper PFM contraction, relaxation, and lengthening coordinated with TA and breath for improved muscle coordination needed for functional activity.     Long Term Goals: 12 weeks   - Pt will demonstrate excellent knowledge and adherence to HEP for continued self-maintenance of symptoms.  - Pt will report FOTO score of 10% improvement or more indicating clinically relevant increase in function.  - Pt will report voiding interval of 2-3 hours for improved ADL tolerance.  PROGRESSING - working on going to the bathroom more often, still doesn't like the  "bathroom. Estimates she goes 1x in the morning, 1-2x during the day, and then 1x before bed. Maybe goes more often depending on how much she's had to drink.   - Pt will report ability to delay urinary urge for at least 15 minutes to maintain continence with ADL/IADLs.   PROGRESSING - getting close to being able to delay 15 min   - Pt will report no incidence of urinary incontinence 7/7 days for improved hygiene and ADL/IADL tolerance.   ACHIEVED - she sneezed yesterday - she stopped (was out for a walk at the time) but she didn't have any leakage.   - Pt will demonstrate PFM strength of at least 3/5 MMT for improved strength needed to maintain continence.   - Pt will report bearing down appropriately 100% of the time for improved bowel function and decreased stress on adjacent pelvic structures.   ACHIEVED - taking medication and using stool. "I have to take the medication" - if she takes it, stool passes easy, but not if she misses it. Taking Metamucil in the evenings.   - Pt will demonstrate independence with pressure-management strategies to decreased stress on adjacent pelvic structures.    Plan     Cont per plan of care     Tram Magaña, PT         "

## 2025-01-28 ENCOUNTER — CLINICAL SUPPORT (OUTPATIENT)
Dept: REHABILITATION | Facility: HOSPITAL | Age: 60
End: 2025-01-28
Payer: COMMERCIAL

## 2025-01-28 DIAGNOSIS — M25.671 IMPAIRED RANGE OF MOTION OF RIGHT ANKLE: Primary | ICD-10-CM

## 2025-01-28 DIAGNOSIS — M62.461 CONTRACTURE OF MUSCLE OF RIGHT LOWER EXTREMITY: ICD-10-CM

## 2025-01-28 DIAGNOSIS — M62.81 MUSCLE WEAKNESS: ICD-10-CM

## 2025-01-28 PROCEDURE — 97112 NEUROMUSCULAR REEDUCATION: CPT | Mod: PN,CQ

## 2025-01-28 PROCEDURE — 97110 THERAPEUTIC EXERCISES: CPT | Mod: PN,CQ

## 2025-01-28 PROCEDURE — 97530 THERAPEUTIC ACTIVITIES: CPT | Mod: PN,CQ

## 2025-01-28 NOTE — PROGRESS NOTES
[facility]   Physical Therapy Treatment Note      Name: Rut Hurtado  Clinic Number: [id number]    Therapy Diagnosis:   Encounter Diagnoses   Name Primary?    Impaired range of motion of right ankle Yes    Muscle weakness     Contracture of muscle of right lower extremity        Physician: Vladislav Sanchez DPM    Visit Date: 1/28/2025    Physician Orders: PT Eval and Treat   Medical Diagnosis from Referral:   M62.461 (ICD-10-CM) - Gastrocnemius equinus of right lower extremity   M77.41 (ICD-10-CM) - Metatarsalgia of right foot   M79.661 (ICD-10-CM) - Right calf pain      Evaluation Date: 1/10/2025  Authorization Period Expiration: 1/10/26  Plan of Care Expiration: 3/15/25  Visit # / Visits authorized: 4/ 20   (POC 4/12)   FOTO Due visit 5  FOTO Due visit 10     Precautions: Standard  Insurance: Payor: [employer] / Plan: [place] PPO / Product Type: PPO /      Time In: 1300  Time Out: 1400  Total Appointment Time (timed & untimed codes): 60 minutes     PTA Visit #: 4/5       Subjective     Patient reports: Has more pain in her right hip/groin region. Reports when she does have foot pain it starts in her foot and shoots up proximally. Denies foot pain upon arrival.     She was compliant with home exercise program.  Response to previous treatment: no issues   Functional change: ongoing     Pain: 1/10  Location: right ankles and leg      Objective      Objective Measures updated at progress report unless specified.     Treatment     Rut received the treatments listed below:      therapeutic exercises to develop strength and ROM for 10 minutes including:    Bike or Nu Step x 10 minutes     NEUROMUSCULAR RE-EDUCATION ACTIVITIES to improve Balance, Coordination, Kinesthetic, Sense, Proprioception, and Posture for 35 minutes.  The following were included:       Ankle 4 way Red TheraBand x 30 each   Wobble Board Multi Direction x 30   Towel Scrunch x 30   Calf Stretch incline 3 x  30 sec hold   DorsiFlexion on Step 20 x 5 sec hold     Supine Right Lower Extremity sciatic nerve glide x 20   Bridges Red Theraband x 30   Side Clams Red Theraband x 30 Bilateral   Precor hip abduction 40# x 30     therapeutic activities to improve functional performance for 8 minutes, including:    Step Ups Forward x 30   Step Ups Lateral x 30    Eccentric heel raises 3 x 8 repetitions     Patient Education and Home Exercises       Education provided:   - home exercises, activity tolerance     Written Home Exercises Provided: Pt instructed to continue prior HEP. Exercises were reviewed and Rut was able to demonstrate them prior to the end of the session.  Rut demonstrated good  understanding of the education provided. See Electronic Medical Record under Patient Instructions for exercises provided during therapy sessions    Assessment     Rut noted with good tolerance to treatment. Continued focus on improving remaining strength deficits to improve symptomology. Rut will continue to benefit from skilled Physical Therapy to maximize strength gains as able.     Rut Is progressing well towards her goals.   Patient prognosis is Good.     Patient will continue to benefit from skilled outpatient physical therapy to address the deficits listed in the problem list box on initial evaluation, provide pt/family education and to maximize pt's level of independence in the home and community environment.     Patient's spiritual, cultural and educational needs considered and pt agreeable to plan of care and goals.     Anticipated barriers to physical therapy: none     Goals:   SHORT TERM GOALS:  3 weeks  Progress Date met    The patient will begin a written HEP [] Met  [] Not Met  [x] Progressing      Increase ankle dorsiflexion to 8 degrees [] Met  [] Not Met  [x] Progressing     Decrease soft tissue tenderness to 3/10 [] Met  [] Not Met  [x] Progressing        LONG TERM GOALS: 6 weeks  Progress Date met    The patient will  be independent with his HEP for maintenance [] Met  [] Not Met  [x] Progressing     Increase ankle dorsiflexion to 10 degrees [] Met  [] Not Met  [x] Progressing     Decrease FOTO score to 30 % [] Met  [] Not Met  [x] Progressing     Decrease soft tissue tenderness to 1/10 [] Met  [] Not Met  [x] Progressing     Walk without foot pain [] Met  [] Not Met  [x] Progressing                      Plan     Continue per Plan of Care     Porsha Handy, PTA

## 2025-01-31 ENCOUNTER — CLINICAL SUPPORT (OUTPATIENT)
Dept: REHABILITATION | Facility: HOSPITAL | Age: 60
End: 2025-01-31
Payer: COMMERCIAL

## 2025-01-31 DIAGNOSIS — M25.671 IMPAIRED RANGE OF MOTION OF RIGHT ANKLE: Primary | ICD-10-CM

## 2025-01-31 DIAGNOSIS — M62.81 MUSCLE WEAKNESS: ICD-10-CM

## 2025-01-31 PROCEDURE — 97112 NEUROMUSCULAR REEDUCATION: CPT | Mod: PN

## 2025-01-31 PROCEDURE — 97110 THERAPEUTIC EXERCISES: CPT | Mod: PN

## 2025-01-31 PROCEDURE — 97530 THERAPEUTIC ACTIVITIES: CPT | Mod: PN

## 2025-01-31 NOTE — PROGRESS NOTES
[facility]   Physical Therapy Treatment Note      Name: Rut BROOKE Genesis  [facility] Number: [id number]    Therapy Diagnosis:   Encounter Diagnoses   Name Primary?    Impaired range of motion of right ankle Yes    Muscle weakness        Physician: Vladislav Sanchez DPM    Visit Date: 1/31/2025    Physician Orders: PT Eval and Treat   Medical Diagnosis from Referral:   M62.461 (ICD-10-CM) - Gastrocnemius equinus of right lower extremity   M77.41 (ICD-10-CM) - Metatarsalgia of right foot   M79.661 (ICD-10-CM) - Right calf pain      Evaluation Date: 1/10/2025  Authorization Period Expiration: 1/10/26  Plan of Care Expiration: 3/15/25  Visit # / Visits authorized: 5/ 20   (POC 5/12)   FOTO Due visit 5  FOTO Due visit 10     Precautions: Standard  Insurance: Payor: [email or web address] / Plan: [place] PPO / Product Type: PPO /      Time In: 1330  Time Out: 1430  Total Appointment Time (timed & untimed codes): 60 minutes     PTA Visit #: 0/5       Subjective     Patient reports: she is having some popping in her ankle and some continued symptoms from her hip to her ankle, points to hamstring and calf today when describing.     She was compliant with home exercise program.  Response to previous treatment: no issues   Functional change: ongoing     Pain: 1/10  Location: right ankles and leg      Objective      Objective Measures updated at progress report unless specified.     Treatment     Rut received the treatments listed below:      therapeutic exercises to develop strength and ROM for 10 minutes including:    Bike or Nu Step x 10 minutes     NEUROMUSCULAR RE-EDUCATION ACTIVITIES to improve Balance, Coordination, Kinesthetic, Sense, Proprioception, and Posture for 35 minutes.  The following were included:       Ankle 4 way Green TheraBand x 30 each   Wobble Board Multi Direction x 30   Towel Scrunch x 30   Calf Stretch incline 3 x 30 sec hold   DorsiFlexion on Step 20 x 5 sec hold     Supine  Right Lower Extremity sciatic nerve glide x 20   Bridges Green Theraband x 30   Side Clams Green Theraband x 30 Bilateral   Precor hip abduction 40# x 30     therapeutic activities to improve functional performance for 8 minutes, including:    Step Ups Forward x 30   Step Ups Lateral x 30    Eccentric heel raises 3 x 8 repetitions     Patient Education and Home Exercises       Education provided:   - home exercises, activity tolerance     Written Home Exercises Provided: Pt instructed to continue prior HEP. Exercises were reviewed and Rut was able to demonstrate them prior to the end of the session.  Rut demonstrated good  understanding of the education provided. See Electronic Medical Record under Patient Instructions for exercises provided during therapy sessions    Assessment     Rut continues to have right ankle and Lower Extremity pain and tightness.  Especially tight with sciatic glides. . Rut will continue to benefit from skilled Physical Therapy to maximize strength gains as able.     Rut Is progressing well towards her goals.   Patient prognosis is Good.     Patient will continue to benefit from skilled outpatient physical therapy to address the deficits listed in the problem list box on initial evaluation, provide pt/family education and to maximize pt's level of independence in the home and community environment.     Patient's spiritual, cultural and educational needs considered and pt agreeable to plan of care and goals.     Anticipated barriers to physical therapy: none     Goals:   SHORT TERM GOALS:  3 weeks  Progress Date met    The patient will begin a written HEP [] Met  [] Not Met  [x] Progressing      Increase ankle dorsiflexion to 8 degrees [] Met  [] Not Met  [x] Progressing     Decrease soft tissue tenderness to 3/10 [] Met  [] Not Met  [x] Progressing        LONG TERM GOALS: 6 weeks  Progress Date met    The patient will be independent with his HEP for maintenance [] Met  [] Not Met  [x]  Progressing     Increase ankle dorsiflexion to 10 degrees [] Met  [] Not Met  [x] Progressing     Decrease FOTO score to 30 % [] Met  [] Not Met  [x] Progressing     Decrease soft tissue tenderness to 1/10 [] Met  [] Not Met  [x] Progressing     Walk without foot pain [] Met  [] Not Met  [x] Progressing                      Plan     Continue per Plan of Care     Steven Matthews, PT

## 2025-02-03 ENCOUNTER — CLINICAL SUPPORT (OUTPATIENT)
Dept: REHABILITATION | Facility: HOSPITAL | Age: 60
End: 2025-02-03
Payer: COMMERCIAL

## 2025-02-03 DIAGNOSIS — M62.89 PELVIC FLOOR DYSFUNCTION: Primary | ICD-10-CM

## 2025-02-03 PROCEDURE — 97112 NEUROMUSCULAR REEDUCATION: CPT | Mod: PO

## 2025-02-03 PROCEDURE — 97140 MANUAL THERAPY 1/> REGIONS: CPT | Mod: PO

## 2025-02-03 NOTE — PROGRESS NOTES
Pelvic Health Physical Therapy   Treatment Note     Name: Rut Hurtado  Clinic Number: [id number]    Therapy Diagnosis:   Encounter Diagnosis   Name Primary?    Pelvic floor dysfunction Yes     Physician: Morenita De Guzman MD    Visit Date: 2/3/2025    Physician Orders: PT Eval and Treat   Medical Diagnosis from Referral: Pelvic floor dysfunction [M62.89]   Evaluation Date: 12/12/2024  Plan of Care Expiration: 3/12/2025  Visit # / Visits authorized: 3 total      FOTO 1 /3 on 12/12/2024        Time In: 830  Time Out: 915  Total Appointment Time (timed & untimed codes): 45 minutes     Precautions: universal     Subjective     Pt reports: this weekend she was sitting down and sneezed - had a few drops come out. Still better than it would have been in the past, would have had 1/2 tsp then. She'd had a soda at that point (Coke).       She was compliant with home exercise program.  Response to previous treatment: tolerated well   Functional change: ongoing     Pain: 0/10  Location: not applicable     Objective     Chaperone:  present       Manual intervetion x15 min  long axis grade 2-3 mobe on right   MET flex/ext on right  MET abd/add     Rut participated in neuromuscular re-education activities for 30 minutes including:  Side-lying clamshells   Supine hip add ball squeeze   Sit to stand with Kegel from elevated table   Side-lying hip abduction   Supine bridge     NOT TODAY  Single knee to chest stretch with deep breathing   Child's pose with deep breathing   Seated child's pose with deep breathing   Kegels - long holds/quick squeezes   Diaphragmatic breathing     Rut participated in dynamic functional therapeutic activities to improve functional performance for 00 minutes, including:  Plan of care review, discussion on prognosis        Home Exercises Provided and Patient Education Provided     Education provided:   - anatomy/physiology of pelvic floor, diaphragmatic breathing, kegels, fluid intake/dietary  modifications, and behavior modifications  Discussed progression of plan of care with patient; educated pt in activity modification; reviewed HEP with pt. Pt demonstrated and verbalized understanding of all instruction and was provided with a handout of HEP (see Patient Instructions).      Written Home Exercises Provided: yes.  Exercises were reviewed and Rut was able to demonstrate them prior to the end of the session.  Rut demonstrated good  understanding of the education provided.     See EMR under Patient Instructions for exercises provided 12/18/2024.    Assessment     Continued with hip mobilization and MET to improve pelvic position for purposes of reducing pain and improving PF length/tension relationship. Pain reduced and mobility improved with mobilization. Updated home exercise program.     Rut Is progressing well towards her goals.   Pt prognosis is Good.     Pt will continue to benefit from skilled outpatient physical therapy to address the deficits listed in the problem list box on initial evaluation, provide pt/family education and to maximize pt's level of independence in the home and community environment.     Pt's spiritual, cultural and educational needs considered and pt agreeable to plan of care and goals.     Anticipated barriers to physical therapy: none     Goals:  Short Term Goals: 6 weeks   - Pt will demonstrate excellent knowledge and adherence to HEP to facilitate optimal recovery.  - Pt will demonstrate proper PFM contraction, relaxation, and lengthening coordinated with TA and breath for improved muscle coordination needed for functional activity.     Long Term Goals: 12 weeks   - Pt will demonstrate excellent knowledge and adherence to HEP for continued self-maintenance of symptoms.  - Pt will report FOTO score of 10% improvement or more indicating clinically relevant increase in function.  - Pt will report voiding interval of 2-3 hours for improved ADL tolerance.  PROGRESSING -  "working on going to the bathroom more often, still doesn't like the bathroom. Estimates she goes 1x in the morning, 1-2x during the day, and then 1x before bed. Maybe goes more often depending on how much she's had to drink.   - Pt will report ability to delay urinary urge for at least 15 minutes to maintain continence with ADL/IADLs.   PROGRESSING - getting close to being able to delay 15 min   - Pt will report no incidence of urinary incontinence 7/7 days for improved hygiene and ADL/IADL tolerance.   ACHIEVED - she sneezed yesterday - she stopped (was out for a walk at the time) but she didn't have any leakage.   - Pt will demonstrate PFM strength of at least 3/5 MMT for improved strength needed to maintain continence.   - Pt will report bearing down appropriately 100% of the time for improved bowel function and decreased stress on adjacent pelvic structures.   ACHIEVED - taking medication and using stool. "I have to take the medication" - if she takes it, stool passes easy, but not if she misses it. Taking Metamucil in the evenings.   - Pt will demonstrate independence with pressure-management strategies to decreased stress on adjacent pelvic structures.    Plan     Cont per plan of care     Tram Magaña, PT           "

## 2025-02-04 ENCOUNTER — CLINICAL SUPPORT (OUTPATIENT)
Dept: REHABILITATION | Facility: HOSPITAL | Age: 60
End: 2025-02-04
Payer: COMMERCIAL

## 2025-02-04 DIAGNOSIS — M62.81 MUSCLE WEAKNESS: ICD-10-CM

## 2025-02-04 DIAGNOSIS — M62.89 MUSCLE TIGHTNESS: ICD-10-CM

## 2025-02-04 DIAGNOSIS — M25.671 IMPAIRED RANGE OF MOTION OF RIGHT ANKLE: Primary | ICD-10-CM

## 2025-02-04 DIAGNOSIS — R26.89 DECREASED FUNCTIONAL MOBILITY: ICD-10-CM

## 2025-02-04 DIAGNOSIS — S43.014S CLOSED ANTERIOR DISLOCATION OF RIGHT SHOULDER, SEQUELA: ICD-10-CM

## 2025-02-04 DIAGNOSIS — R53.1 DECREASED STRENGTH: Primary | ICD-10-CM

## 2025-02-04 PROCEDURE — 97530 THERAPEUTIC ACTIVITIES: CPT | Mod: PN

## 2025-02-04 PROCEDURE — 97112 NEUROMUSCULAR REEDUCATION: CPT | Mod: PN

## 2025-02-04 PROCEDURE — 97161 PT EVAL LOW COMPLEX 20 MIN: CPT | Mod: PN

## 2025-02-04 PROCEDURE — 97110 THERAPEUTIC EXERCISES: CPT | Mod: PN

## 2025-02-04 NOTE — PROGRESS NOTES
[facility]   Physical Therapy Treatment Note      Name: Rut Hurtado  Clinic Number: [id number]    Therapy Diagnosis:   Encounter Diagnoses   Name Primary?    Impaired range of motion of right ankle Yes    Muscle weakness        Physician: Vladislav Sanchez DPM    Visit Date: 2/4/2025    Physician Orders: PT Eval and Treat   Medical Diagnosis from Referral:   M62.461 (ICD-10-CM) - Gastrocnemius equinus of right lower extremity   M77.41 (ICD-10-CM) - Metatarsalgia of right foot   M79.661 (ICD-10-CM) - Right calf pain      Evaluation Date: 1/10/2025  Authorization Period Expiration: 1/10/26  Plan of Care Expiration: 3/15/25  Visit # / Visits authorized: 6/ 20   (POC 6/12)   FOTO Due visit 5  FOTO Due visit 10     Precautions: Standard  Insurance: Payor: [employer] / Plan: [place] PPO / Product Type: PPO /      Time In: 1300  Time Out: 1400  Total Appointment Time (timed & untimed codes): 60 minutes     PTA Visit #: 0/5       Subjective     Patient reports: Ankle is not popping today    She was compliant with home exercise program.  Response to previous treatment: no issues   Functional change: ongoing     Pain: 1/10  Location: right ankles and leg      Objective      Objective Measures updated at progress report unless specified.     Treatment     Rut received the treatments listed below:      therapeutic exercises to develop strength and ROM for 10 minutes including:    Bike or Nu Step x 10 minutes     NEUROMUSCULAR RE-EDUCATION ACTIVITIES to improve Balance, Coordination, Kinesthetic, Sense, Proprioception, and Posture for 35 minutes.  The following were included:       Ankle 4 way Green TheraBand x 30 each   Wobble Board Multi Direction x 30   Towel Scrunch x 30   Calf Stretch incline 3 x 30 sec hold   DorsiFlexion on Step 20 x 5 sec hold     Supine Right Lower Extremity sciatic nerve glide x 20   Bridges Green Theraband x 30   Side Clams Green Theraband x 30 Bilateral    + Lumbar Extension against mat   Precor hip abduction 45# x 30   + Precor Lumbar extension 50# x 20     therapeutic activities to improve functional performance for 8 minutes, including:    Step Ups Forward x 30   Step Ups Lateral x 30    Eccentric heel raises 3 x 8 repetitions   +Shuttle     Patient Education and Home Exercises       Education provided:   - home exercises, activity tolerance     Written Home Exercises Provided: Pt instructed to continue prior HEP. Exercises were reviewed and Rut was able to demonstrate them prior to the end of the session.  Rut demonstrated good  understanding of the education provided. See Electronic Medical Record under Patient Instructions for exercises provided during therapy sessions    Assessment     Rut still has lack of flexibility and some weakness in her Lower Extremity . Gets symptoms fro her ankle to her lateral hip. Feel some is radiating from her back.  Difficulty with nerve glides, needs to do more often  . Rut will continue to benefit from skilled Physical Therapy to maximize strength gains as able.     Rut Is progressing well towards her goals.   Patient prognosis is Good.     Patient will continue to benefit from skilled outpatient physical therapy to address the deficits listed in the problem list box on initial evaluation, provide pt/family education and to maximize pt's level of independence in the home and community environment.     Patient's spiritual, cultural and educational needs considered and pt agreeable to plan of care and goals.     Anticipated barriers to physical therapy: none     Goals:   SHORT TERM GOALS:  3 weeks  Progress Date met    The patient will begin a written HEP [] Met  [] Not Met  [x] Progressing      Increase ankle dorsiflexion to 8 degrees [] Met  [] Not Met  [x] Progressing     Decrease soft tissue tenderness to 3/10 [] Met  [] Not Met  [x] Progressing        LONG TERM GOALS: 6 weeks  Progress Date met    The patient will be  independent with his HEP for maintenance [] Met  [] Not Met  [x] Progressing     Increase ankle dorsiflexion to 10 degrees [] Met  [] Not Met  [x] Progressing     Decrease FOTO score to 30 % [] Met  [] Not Met  [x] Progressing     Decrease soft tissue tenderness to 1/10 [] Met  [] Not Met  [x] Progressing     Walk without foot pain [] Met  [] Not Met  [x] Progressing                      Plan     Continue per Plan of Care     Steven Matthews, PT

## 2025-02-04 NOTE — PROGRESS NOTES
Outpatient Rehab    Physical Therapy Evaluation    Patient Name: Rut Hurtado  MRN: [id number]  YOB: 1965  Today's Date: 2/18/2025    Therapy Diagnosis:   Encounter Diagnosis   Name Primary?    Closed anterior dislocation of right shoulder, sequela      Physician: Ana Maria Childress, JAKUB    Physician Orders: Eval and Treat  Medical Diagnosis:   Diagnosis   S43.014S (ICD-10-CM) - Closed anterior dislocation of right shoulder, sequela       Visit # / Visits Authorized:  1 / 1   Date of Evaluation:  2/4/2025   Insurance Authorization Period: 1/10/2025 to 12/31/2025  Plan of Care Certification:  2/4/2025 to 4/1/2025     Time In: 1400   Time Out: 1450  Total Time: 50   Total Billable Time: 50         Subjective   History of Present Illness  Rut is a 59 y.o. female who reports to physical therapy with a chief concern of R shoulder pain. According to the patient's chart, Rut has no past medical history on file. Rut has a past surgical history that includes Hysterectomy and Colonoscopy (N/A, 11/08/2022).                History of Present Condition/Illness: Pain started about 2 months ago.  States [place]   States that she dislocated her R shoulder anteriorly.  States that she went to the ER and had it reduced.  Pain intensity and frequency has improved since onset.  Denies popping, clicking, instability, swelling.  No previous dislocations.      Pain     Patient reports a current pain level of 6/10. Pain at best is reported as 3/10. Pain at worst is reported as 7/10.   Location: R shoulder  Pain Qualities: Dull, Aching  Pain-Relieving Factors: Medications - over-the-counter, Rest, Immobilization  Pain-Aggravating Factors: Lifting  Aggs:  pushing vacuum, holding arm on side      Living Arrangements  Living Situation  Living Arrangements: Family members  Support Systems: Family members        Employment  Employment Status: Retired          Past Medical  History/Physical Systems Review:   Rut Hurtado  has no past medical history on file.    Rut Hurtado  has a past surgical history that includes Hysterectomy and Colonoscopy (N/A, 11/08/2022).    Rut has a current medication list which includes the following prescription(s): ascorbic acid (vitamin c), estradiol, and linzess.    Review of patient's allergies indicates:  No Known Allergies     Objective   Posture  Patient presents with a Forward head position.     Shoulders are Rounded.             Shoulder Range of Motion  Right Shoulder   Active (deg) Passive (deg) Pain   Flexion 95 95     Extension 10 10     Scaption         ABduction 85 85     ADduction         Horizontal ABduction         Horizontal ADduction         External Rotation (Shoulder ABducted 0 degrees)         External Rotation (Shoulder ABducted 45 degrees)         External Rotation (Shoulder ABducted 90 degrees)         Internal Rotation (Shoulder ABducted 0 degrees)         Internal Rotation (Shoulder ABducted 45 degrees)         Internal Rotation (Shoulder ABducted 90 degrees)                       Shoulder Strength - Planes of Motion   Right Strength Right Pain Left Strength Left  Pain   Flexion 2+   4-     Extension 3   4-     ABduction 2+   4-     ADduction 2+   4-     Horizontal ABduction 2+   4-     Horizontal ADduction 2+   4-     Internal Rotation 0° 2+   4-     Internal Rotation 90° 2+   4-     External Rotation 0° 2+   4-     External Rotation 90° 2+   4-         Elbow Strength   Right Strength Right Pain Left Strength Left  Pain   Flexion (C6) 4   4     Extension (C7) 4   4          Forearm Strength   Right Strength Right Pain Left Strength Left  Pain   Pronation 4   4     Supination 4   4                    Intake Outcome Measure for FOTO Survey    Therapist reviewed FOTO scores for Rut Hurtado on 2/4/2025.   FOTO report - see Media section or FOTO account episode details.     Intake Score: 55%    Treatment:  Therapeutic  Activity  Therapeutic Activity 1: IR with red theraband 3x10  Therapeutic Activity 2: ER with red theraband 3x10  Therapeutic Activity 3: Wall slides with towel (flexion) 3x10    Next visit:    UBE 3/3    Scapular squeezes  3x10   Shoulder rolls 3x10    Pulleys 3'/3' each direction  (flexion/scaption)    Upper trap stretches 3x30 secs Bilateral     Supine shoulder flexion with dowel  3x10  SA punches with dowel  3x10    Sidelying ER  1#  2x10  Prone extension 1# 2x10  Prone rows  1# 2x10  Open books x20 B     Seated trunk extension over half bolster x 30    IR/ER with red theraband  3 x 10 each  NP       Wall slides x 30 with towel  (flexion)    CC Rows 3#  2x10  CC Shoulder extension 3#  2x10  CC Shoulder adduction 3#  2x10       Patient's spiritual, cultural, and educational needs considered and patient agreeable to plan of care and goals.     Assessment & Plan   Assessment  Rut    Presentation of Symptoms: Stable  Will Comorbidities Impact Care: No            Patient Goal for Therapy (PT): to be able to reach overhead with no pain.  Prognosis: Good  Prognosis Details: Pt will benefit from skilled outpatient Physical Therapy to address the deficits stated above and in the chart below, provide pt/family education, and to maximize pt's level of independence.    Assessment Details: Rut is a 59 year old female referred to outpatient Physical Therapy with a medical diagnosis of S43.014S (ICD-10-CM) - Closed anterior dislocation of right shoulder, sequela. Rut presents with clinical signs and symptoms that support this diagnosis with decreased shoulder girdle ROM, decreased scapular and shoulder strength, Glenohumeral joint hypomobility, and impaired functional mobility. The above impairments will be addressed through manual therapy techniques, therapeutic exercises, functional training, and modalities as necessary. Patient was treated and educated on exercises for home program, progression of therapy, and benefits  of therapy to achieve full functional mobility.      Plan  From a physical therapy perspective, the patient would benefit from: Skilled Rehab Services    Planned therapy interventions include: Therapeutic exercise, Therapeutic activities, Neuromuscular re-education, and Manual therapy.            Visit Frequency: 2 times Per Week for 8 Weeks.       This plan was discussed with Patient.   Discussion participants: Agreed Upon Plan of Care             Goals:   Active       LTG       Patient will return to normal ADL, recreational, and work related activities with less pain and limitation.  (Progressing)       Start:  02/04/25    Expected End:  04/01/25            Pnt to achieve full shoulder AROM with 1/10 pain  (Progressing)       Start:  02/04/25    Expected End:  04/01/25            Patient will meet predicted functional outcome (FOTO) score: 80% to increase self-worth & perceived functional ability.  (Progressing)       Start:  02/04/25    Expected End:  04/01/25               STG       Recent signs and systems trend is improving in order to progress towards LTG's.  (Progressing)       Start:  02/04/25    Expected End:  03/04/25            Patient will be independent with HEP in order to further progress and return to maximal function.  (Progressing)       Start:  02/04/25    Expected End:  03/04/25            Patient will be able to correct postural deviations in sitting and standing, to decrease pain and promote postural awareness for injury prevention.  (Progressing)       Start:  02/04/25    Expected End:  03/04/25                Gabe Gonzalez, PT

## 2025-02-07 ENCOUNTER — CLINICAL SUPPORT (OUTPATIENT)
Dept: REHABILITATION | Facility: HOSPITAL | Age: 60
End: 2025-02-07
Payer: COMMERCIAL

## 2025-02-07 DIAGNOSIS — M25.671 IMPAIRED RANGE OF MOTION OF RIGHT ANKLE: Primary | ICD-10-CM

## 2025-02-07 DIAGNOSIS — M62.81 MUSCLE WEAKNESS: ICD-10-CM

## 2025-02-07 PROCEDURE — 97530 THERAPEUTIC ACTIVITIES: CPT | Mod: PN

## 2025-02-07 PROCEDURE — 97110 THERAPEUTIC EXERCISES: CPT | Mod: PN

## 2025-02-07 PROCEDURE — 97112 NEUROMUSCULAR REEDUCATION: CPT | Mod: PN

## 2025-02-07 NOTE — PROGRESS NOTES
[facility]   Physical Therapy Treatment Note      Name: Rut Hurtado  Clinic Number: [id number]    Therapy Diagnosis:   Encounter Diagnoses   Name Primary?    Impaired range of motion of right ankle Yes    Muscle weakness        Physician: Vladislav Sanchez DPM    Visit Date: 2/7/2025    Physician Orders: PT Eval and Treat   Medical Diagnosis from Referral:   M62.461 (ICD-10-CM) - Gastrocnemius equinus of right lower extremity   M77.41 (ICD-10-CM) - Metatarsalgia of right foot   M79.661 (ICD-10-CM) - Right calf pain      Evaluation Date: 1/10/2025  Authorization Period Expiration: 1/10/26  Plan of Care Expiration: 3/15/25  Visit # / Visits authorized: 7/ 20   (POC 7/12)   FOTO Due visit 5  FOTO Due visit 10     Precautions: Standard  Insurance: Payor: [employer] / Plan: [employer] [place] PPO / Product Type: PPO /      Time In: 1330  Time Out: 1430  Total Appointment Time (timed & untimed codes): 60 minutes     PTA Visit #: 0/5       Subjective     Patient reports: Feeling better, still has some discomfort     She was compliant with home exercise program.  Response to previous treatment: no issues   Functional change: ongoing     Pain: 1/10  Location: right ankles and leg      Objective      Objective Measures updated at progress report unless specified.     Treatment     Rut received the treatments listed below:      therapeutic exercises to develop strength and ROM for 10 minutes including:    Bike or Nu Step x 10 minutes     NEUROMUSCULAR RE-EDUCATION ACTIVITIES to improve Balance, Coordination, Kinesthetic, Sense, Proprioception, and Posture for 35 minutes.  The following were included:       Ankle 4 way Green TheraBand x 30 each   Wobble Board Multi Direction x 30   Towel Scrunch x 30   Calf Stretch incline 3 x 30 sec hold   DorsiFlexion on Step 20 x 5 sec hold     Supine Right Lower Extremity sciatic nerve glide x 20   Bridges Green Theraband x 30   Side Clams Green Theraband x  30 Bilateral    Lumbar Extension against mat   Precor hip abduction 45# x 30    Precor Lumbar extension 50# x 20     therapeutic activities to improve functional performance for 8 minutes, including:    Step Ups Forward x 30   Step Ups Lateral x 30    Eccentric heel raises 3 x 8 repetitions   +Shuttle     Patient Education and Home Exercises       Education provided:   - home exercises, activity tolerance     Written Home Exercises Provided: Pt instructed to continue prior HEP. Exercises were reviewed and Rut was able to demonstrate them prior to the end of the session.  Rut demonstrated good  understanding of the education provided. See Electronic Medical Record under Patient Instructions for exercises provided during therapy sessions    Assessment     Rut has some discomfort, but improved strength and flexibility .  Feels some of her upper leg symptoms are caused by her back.  Rut will continue to benefit from skilled Physical Therapy to maximize strength gains as able.     Rut Is progressing well towards her goals.   Patient prognosis is Good.     Patient will continue to benefit from skilled outpatient physical therapy to address the deficits listed in the problem list box on initial evaluation, provide pt/family education and to maximize pt's level of independence in the home and community environment.     Patient's spiritual, cultural and educational needs considered and pt agreeable to plan of care and goals.     Anticipated barriers to physical therapy: none     Goals:   SHORT TERM GOALS:  3 weeks  Progress Date met    The patient will begin a written HEP [] Met  [] Not Met  [x] Progressing      Increase ankle dorsiflexion to 8 degrees [] Met  [] Not Met  [x] Progressing     Decrease soft tissue tenderness to 3/10 [] Met  [] Not Met  [x] Progressing        LONG TERM GOALS: 6 weeks  Progress Date met    The patient will be independent with his HEP for maintenance [] Met  [] Not Met  [x] Progressing      Increase ankle dorsiflexion to 10 degrees [] Met  [] Not Met  [x] Progressing     Decrease FOTO score to 30 % [] Met  [] Not Met  [x] Progressing     Decrease soft tissue tenderness to 1/10 [] Met  [] Not Met  [x] Progressing     Walk without foot pain [] Met  [] Not Met  [x] Progressing                      Plan     Continue per Plan of Care     Steven Matthews, PT

## 2025-02-18 ENCOUNTER — CLINICAL SUPPORT (OUTPATIENT)
Dept: REHABILITATION | Facility: HOSPITAL | Age: 60
End: 2025-02-18
Payer: COMMERCIAL

## 2025-02-18 DIAGNOSIS — R53.1 DECREASED STRENGTH: Primary | ICD-10-CM

## 2025-02-18 DIAGNOSIS — M62.89 PELVIC FLOOR DYSFUNCTION: Primary | ICD-10-CM

## 2025-02-18 DIAGNOSIS — R26.89 DECREASED FUNCTIONAL MOBILITY: ICD-10-CM

## 2025-02-18 DIAGNOSIS — M62.89 MUSCLE TIGHTNESS: ICD-10-CM

## 2025-02-18 PROCEDURE — 97530 THERAPEUTIC ACTIVITIES: CPT | Mod: PN

## 2025-02-18 PROCEDURE — 97112 NEUROMUSCULAR REEDUCATION: CPT | Mod: PN

## 2025-02-18 PROCEDURE — 97112 NEUROMUSCULAR REEDUCATION: CPT | Mod: PO

## 2025-02-18 PROCEDURE — 97110 THERAPEUTIC EXERCISES: CPT | Mod: PN

## 2025-02-18 NOTE — PROGRESS NOTES
"  Pelvic Health Physical Therapy   Treatment Note     Name: Rut Hurtado  Clinic Number: [id number]    Therapy Diagnosis:   Encounter Diagnosis   Name Primary?    Pelvic floor dysfunction Yes     Physician: Morenita De Guzman MD    Visit Date: 2/18/2025    Physician Orders: PT Eval and Treat   Medical Diagnosis from Referral: Pelvic floor dysfunction [M62.89]   Evaluation Date: 12/12/2024  Plan of Care Expiration: 3/12/2025  Visit # / Visits authorized: 6 total           Time In: 830  Time Out: 900  Total Appointment Time (timed & untimed codes): 30 minutes     Precautions: universal     Subjective     Pt reports: just got back from her trip. Doing pretty good - now it takes about 5 sneezes in a row for her to have about a 1/2 tsp spot of leakage come out (has been sneezing more often lately).   Bowel movements are "ok"    She was compliant with home exercise program.  Response to previous treatment: tolerated well   Functional change: ongoing     Pain: 0/10  Location: not applicable     Objective     Chaperone:  present       Manual intervetion x15 min  long axis grade 2-3 mobe on right   MET flex/ext on right  MET abd/add     Rut participated in neuromuscular re-education activities for 30 minutes including:  Side-lying clamshells   Supine hip add ball squeeze   Sit to stand with Kegel from elevated table   Side-lying hip abduction   Supine bridge     NOT TODAY  Single knee to chest stretch with deep breathing   Child's pose with deep breathing   Seated child's pose with deep breathing   Kegels - long holds/quick squeezes   Diaphragmatic breathing     Rut participated in dynamic functional therapeutic activities to improve functional performance for 00 minutes, including:  Plan of care review, discussion on prognosis        Home Exercises Provided and Patient Education Provided     Education provided:   - anatomy/physiology of pelvic floor, diaphragmatic breathing, kegels, fluid intake/dietary modifications, " and behavior modifications  Discussed progression of plan of care with patient; educated pt in activity modification; reviewed HEP with pt. Pt demonstrated and verbalized understanding of all instruction and was provided with a handout of HEP (see Patient Instructions).      Written Home Exercises Provided: yes.  Exercises were reviewed and Rut was able to demonstrate them prior to the end of the session.  Rut demonstrated good  understanding of the education provided.     See EMR under Patient Instructions for exercises provided 12/18/2024.    Assessment     Assessment of progress made. Patient has made excellent advancements since start of care and is ready for independent home exercise program at this time. Patient agrees with plan.     Rut Is progressing well towards her goals.   Pt prognosis is Good.     Pt's spiritual, cultural and educational needs considered and pt agreeable to plan of care and goals.     Anticipated barriers to physical therapy: none     Goals:  Short Term Goals: 6 weeks   - Pt will demonstrate excellent knowledge and adherence to HEP to facilitate optimal recovery.  - Pt will demonstrate proper PFM contraction, relaxation, and lengthening coordinated with TA and breath for improved muscle coordination needed for functional activity.     Long Term Goals: 12 weeks   - Pt will demonstrate excellent knowledge and adherence to HEP for continued self-maintenance of symptoms.  - Pt will report FOTO score of 10% improvement or more indicating clinically relevant increase in function.  - Pt will report voiding interval of 2-3 hours for improved ADL tolerance.  PROGRESSING - working on going to the bathroom more often, still doesn't like the bathroom. Estimates she goes 1x in the morning, 1-2x during the day, and then 1x before bed. Maybe goes more often depending on how much she's had to drink.   - Pt will report ability to delay urinary urge for at least 15 minutes to maintain continence with  "ADL/IADLs.   ACHIEVED - able to delay 15 min   - Pt will report no incidence of urinary incontinence 7/7 days for improved hygiene and ADL/IADL tolerance.   ACHIEVED - she sneezed yesterday - she stopped (was out for a walk at the time) but she didn't have any leakage.   - Pt will demonstrate PFM strength of at least 3/5 MMT for improved strength needed to maintain continence.   ACHIEVED  - Pt will report bearing down appropriately 100% of the time for improved bowel function and decreased stress on adjacent pelvic structures.   ACHIEVED - taking medication and using stool. "I have to take the medication" - if she takes it, stool passes easy, but not if she misses it. Taking Metamucil in the evenings.   - Pt will demonstrate independence with pressure-management strategies to decreased stress on adjacent pelvic structures.  ACHIEVED    Plan     Patient is appropriate to be discharged and continue with independent HEP at this time. Patient instructed to contact PT with any questions or concerns following discharge.      Tram Magaña, PT             "

## 2025-02-18 NOTE — PROGRESS NOTES
Outpatient Rehab    Physical Therapy Visit    Patient Name: Rut Hurtado  MRN: [id number]  YOB: 1965  Today's Date: 2/18/2025    Therapy Diagnosis:   Encounter Diagnoses   Name Primary?    Decreased strength Yes    Muscle tightness     Decreased functional mobility      Physician: Vladislav Sanchez DPM    Physician Orders: Eval and Treat  Medical Diagnosis: S43.014S (ICD-10-CM) - Closed anterior dislocation of right shoulder, sequela     Visit # / Visits Authorized:  1 / 12   Insurance Authorization Period: 1/10/2025 to 12/31/2025  Plan of Care Certification:   to 4/1/2025      Time In: 1049   Time Out: 1149  Total Time: 60  Total Billable Time: 60    FOTO:  Intake Score:  %  Survey Score 1:  %  Survey Score 2:  %         Subjective   States that she is feeling ok today, no complaints..  Pain reported as 3/10.      Objective          Therapeutic exercise x 10 minutes including the following:    UBE 3/3  Scapular squeezes  3x10     Neuro reeducation x 40 minutes including the following:    Shoulder rolls 3x10     Pulleys 3'/3' each direction  (flexion/scaption) - np     Upper trap stretches 3x30 secs on R     Supine shoulder flexion with dowel  3x10  SA punches with dowel  3x10     Sidelying ER  2#  2x10  Prone extension 1# 2x10  Prone rows  1# 2x10  Open books x20 B     Seated trunk extension over half bolster x 30  IR/ER with red theraband  3 x 10 each     Wall slides x 30 with towel  (flexion)     Therapeutic activities x 10 minutes including the following:     CC Rows 3#  2x10  CC Shoulder extension 3#  2x10  CC Shoulder adduction 3#  2x10   - np    Assessment & Plan   Assessment:    Evaluation/Treatment Tolerance: Patient tolerated treatment well    Patient will continue to benefit from skilled outpatient physical therapy to address the deficits listed in the problem list box on initial evaluation, provide pt/family education and to maximize pt's level of independence in the home and community  environment.     Patient's spiritual, cultural, and educational needs considered and patient agreeable to plan of care and goals.           Plan:      Goals:   Active       LTG       Patient will return to normal ADL, recreational, and work related activities with less pain and limitation.  (Progressing)       Start:  02/18/25    Expected End:  04/15/25            Patient will meet predicted functional outcome (FOTO) score: 80% to increase self-worth & perceived functional ability.  (Progressing)       Start:  02/18/25    Expected End:  04/15/25            Patient will have met/partially met personal goal of being able to reach overhead with no pain. (Progressing)       Start:  02/18/25    Expected End:  04/15/25               STG       Recent signs and systems trend is improving in order to progress towards LTG's.  (Progressing)       Start:  02/18/25    Expected End:  03/18/25            Patient will be independent with HEP in order to further progress and return to maximal function.  (Progressing)       Start:  02/18/25    Expected End:  03/18/25            Patient will be able to correct postural deviations in sitting and standing, to decrease pain and promote postural awareness for injury prevention.  (Progressing)       Start:  02/18/25    Expected End:  03/18/25                  Gabe Gonzalez, PT

## 2025-02-18 NOTE — PATIENT INSTRUCTIONS
KEGELS - LONG HOLDS  1. Lay or Sit comfortably with legs and buttocks relaxed.  2. Contract and LIFT the pelvic floor muscles as if you're trying to stop the stream of urine and passage of gas.  3. Hold LIFT for 5 seconds without holding breath. You should be able to talk out loud the entire time.  4. Release the pelvic muscles right away for 10 seconds rest.  5. Repeat 10 times, 3 sets per day. Spread throughout the day.     KEGELS - QUICK SQUEEZES (perform after long holds)   1. Lay or Sit comfortably with legs and buttocks relaxed.  2. Contract and LIFT the pelvic floor muscles as if you're trying to stop the stream of urine and passage of gas.  3. Hold LIFT for 1-2 seconds without holding breath. You should be able to talk out loud the entire time.  4. Release the pelvic muscles right away for 1-2 seconds rest.  5. Repeat 10 times, 3 sets per day. Spread throughout the day.     No Kegels while urinating!      ________________________________________________________     DIAPHRAGMATIC BREATHING with Stretches - perform daily (ideally) to help relax muscles.        The diaphragm is a dome shaped muscle that forms the floor of the rib cage. It is the most efficient muscle for breathing and relaxation, although most people are not used to using the diaphragm. Diaphragmatic or belly breathing is an important technique to learn because it helps settle down or relax the autonomic nervous system. The correct use of diaphragmatic breathing can help to quiet brain activity resulting in the relaxation of all the muscles and organs of the body. This is accomplished by slow rhythmic breathing concentrated in the diaphragm muscle rather than the chest.     How to do proper relaxation breathing:       Start by lying on your back or reclining in a chair in a relaxed position. Place one hand on your chest and the other on your abdomen.  Relax your jaw by placing your tongue on the floor of your mouth and keeping your teeth  "slightly apart.   Take a deep breath in, letting the abdomen expand and rise while you keep your upper chest, neck and shoulders relaxed.   As you breathe out, allow your abdomen and chest to fall. Exhale completely.  It doesn't matter if you breathe in/out through your nose and/or mouth. Do whichever feels comfortable.  Remember to breathe slowly.  Do not force your breathing. Do not hold your breath.  Repeat daily while doing stretches listed below:           "single knee to chest" - lay on your back, use your hands to pull your left knee to your chest, keeping the right leg straight on the bed. Hold this pose while you incorporate your diaphragmatic breathing. Repeat on the opposite side. Complete 10-20 breaths on both legs.      __________________________________________________________                   Seated Child's Pose - sit with knees and feet spread wide. Next, either lean forward to rest elbows on your knees (left) or resting your head and chest against a cushion (right). Think about resting in this position. Complete 10-20 breaths.    ________________________________________________________    Hip Strengthening - aim for 3-4 times per week            Clamshells   - Start laying on your side, feet together and knees bent. You can add resistance band just above your knees  - Keep your feet together as you rotate your hip to bring your knee up. Don't let your pelvis rock forward.   - Perform 2-3 sets of 12 on each side.             Side-lying Leg Lifts      - Start lying on your side with your bottom knee bent and your top leg straight. Slightly roll your top hip forward.   - Bring your top leg back slightly so that your leg is in a straight line with your trunk.   - Keeping your knee straight, raise your leg up towards the ceiling, then slowly lower back down. You should feel your muscle on the side of your hip/glute leonor.   - Perform 2-3 sets of 12 on each side.              Bridges  Start on your " "back with knees bent  Squeeze your buttocks and then raise your buttocks off the floor/bed as creating a "Bridge" with your body. Hold for 5 seconds and then lower yourself and repeat.  Repeat 2-3 sets of 12 reps     "

## 2025-02-25 ENCOUNTER — CLINICAL SUPPORT (OUTPATIENT)
Dept: REHABILITATION | Facility: HOSPITAL | Age: 60
End: 2025-02-25
Attending: PHYSICAL THERAPIST
Payer: COMMERCIAL

## 2025-02-25 DIAGNOSIS — R53.1 DECREASED STRENGTH: ICD-10-CM

## 2025-02-25 DIAGNOSIS — R26.89 DECREASED FUNCTIONAL MOBILITY: Primary | ICD-10-CM

## 2025-02-25 DIAGNOSIS — M62.89 MUSCLE TIGHTNESS: ICD-10-CM

## 2025-02-25 PROCEDURE — 97112 NEUROMUSCULAR REEDUCATION: CPT | Mod: PN

## 2025-02-25 PROCEDURE — 97110 THERAPEUTIC EXERCISES: CPT | Mod: PN

## 2025-02-25 NOTE — PROGRESS NOTES
"Physical Therapy Visit    Patient Name: Rut Hurtado  MRN: [id number]  YOB: 1965  Encounter Date: 2/25/2025    Therapy Diagnosis:   Encounter Diagnoses   Name Primary?    Decreased strength     Muscle tightness     Decreased functional mobility Yes     Physician: Vladislav Sanchez DPM    Physician Orders: Eval and Treat  Medical Diagnosis: S43.014S (ICD-10-CM) - Closed anterior dislocation of right shoulder, sequela      Visit # / Visits Authorized:  2 / 12   Insurance Authorization Period: 1/10/2025 to 12/31/2025  Plan of Care Certification:   to 4/1/2025                  Time In: 0859   Time Out: 0941  Total Time: 42   Total Billable Time: 35 minutes    FOTO:  Intake Score:  55%  Survey Score 1:  %  Survey Score 2:  %       Subjective   "The past few days have been really rough. I started not to come. All that I can think of is that I vacuumed, but I have vacuumed before. I'd give it an 8/10 right now." PT acknowledges..  Pain reported as 8/10.    Objective          Treatment:  Rut received therapeutic exercises to develop strength, endurance, ROM, flexibility, posture, and core stabilization for 10 minutes including:  -Upper body ergometer on Level 2 forwards/backwards x 8 minutes  -Scapular squeezes with 2 second hold 2 x 10     Rut participated in neuromuscular re-education activities to improve: Balance, Coordination, Kinesthetic, Sense, Proprioception, Posture, and muscle recruitment for 25 minutes. The following activities were included:  -Supine shoulder flexion with 1 pound dowel 2 x 10  -Serratus Anterior punches with 1 pound dowel 2 x 10  -side lying external rotation with 2 pound dumb bell 3 x 10  -Prone right shoulder extension with 1 pound dumb bell 2 x 10  -Prone right shoulder rows with 1 pound dumb bell 2 x 10  -+side lying horizontal flexion with dowel to overhead tolerance x 10    Below not performed this day as pain was exacerbated, hope to resume at the next session:  -Bilateral " open books x 20 each  -seated trunk extension over half bolster x 20  -Red banded active right shoulder internal and external rotation 3 x 10 each  -Wall slides x 30 with towel for flexion  -shoulder rolls 3 x 10  -Pulleys for flexion and scaption x 3 minutes eachdirection  -Upper trapezius stretches on the right with 30 second holds x 3     Rut participated in dynamic functional therapeutic activities to improve functional performance for 0 minutes, including:  CC Rows 3#  2x10  CC Shoulder extension 3#  2x10  CC Shoulder adduction 3#  2x10   - np     Assessment & Plan   Assessment: Rut entered this day with an increase in her right shoulder pain of unknown cause. Care was taken to avoid making her pain worse. PT adjusted resistances as needed. She had difficulty with external rotation due to end range pain. She was advised to keep her motion in a pain free range. Muscle fatigue did set in. She can benefit from continued right shoulder strengthening in order to improve its stabilty. She tolerated today's PT session well.  Evaluation/Treatment Tolerance: Patient limited by pain    The patient will continue to benefit from skilled outpatient physical therapy in order to address the deficits listed in the problem list box on the initial evaluation, provide patient/family education and to maximize the patient's level of independence in the home and in the community environment.     The patient's spiritual, cultural, and educational needs were considered. The patient was agreeable to the plan of care and its goals.     Education  Education was done with Patient. The patient's learning style includes Listening. The patient Verbalizes understanding.         The patient does not prefer a cold pack. The patient was educated on the positive affects of a cold pack to help reduce her pain. She verbally acknowledged. She declined a cold pack in the clinic, but she reports that she may try one at home.     Plan: Plan to  continue to safely improve her right shoulder motion and strength in order to provide pain relief and improved functional use. Plan to progress her home program as she tolerates.    Goals:   Right Foot/Ankle Pain Problems       Right Foot/Ankle Pain Problems (Active)       LTG       Patient will return to normal ADL, recreational, and work related activities with less pain and limitation.  (Progressing)       Start:  02/04/25    Expected End:  04/01/25            Pnt to achieve full shoulder AROM with 1/10 pain  (Progressing)       Start:  02/04/25    Expected End:  04/01/25            Patient will meet predicted functional outcome (FOTO) score: 80% to increase self-worth & perceived functional ability.  (Progressing)       Start:  02/04/25    Expected End:  04/01/25               STG       Recent signs and systems trend is improving in order to progress towards LTG's.  (Progressing)       Start:  02/04/25    Expected End:  03/04/25            Patient will be independent with HEP in order to further progress and return to maximal function.  (Progressing)       Start:  02/04/25    Expected End:  03/04/25            Patient will be able to correct postural deviations in sitting and standing, to decrease pain and promote postural awareness for injury prevention.  (Progressing)       Start:  02/04/25    Expected End:  03/04/25                 R shoulder pain Problems       R shoulder pain Problems (Active)       LTG       Patient will return to normal ADL, recreational, and work related activities with less pain and limitation.  (Progressing)       Start:  02/18/25    Expected End:  04/15/25            Patient will meet predicted functional outcome (FOTO) score: 80% to increase self-worth & perceived functional ability.  (Progressing)       Start:  02/18/25    Expected End:  04/15/25            Patient will have met/partially met personal goal of being able to reach overhead with no pain. (Progressing)       Start:   02/18/25    Expected End:  04/15/25               STG       Recent signs and systems trend is improving in order to progress towards LTG's.  (Progressing)       Start:  02/18/25    Expected End:  03/18/25            Patient will be independent with HEP in order to further progress and return to maximal function.  (Progressing)       Start:  02/18/25    Expected End:  03/18/25            Patient will be able to correct postural deviations in sitting and standing, to decrease pain and promote postural awareness for injury prevention.  (Progressing)       Start:  02/18/25    Expected End:  03/18/25                   Lobo Nolan PT

## 2025-03-06 ENCOUNTER — CLINICAL SUPPORT (OUTPATIENT)
Dept: REHABILITATION | Facility: HOSPITAL | Age: 60
End: 2025-03-06
Payer: COMMERCIAL

## 2025-03-06 DIAGNOSIS — R53.1 DECREASED STRENGTH: Primary | ICD-10-CM

## 2025-03-06 DIAGNOSIS — M62.89 MUSCLE TIGHTNESS: ICD-10-CM

## 2025-03-06 DIAGNOSIS — R26.89 DECREASED FUNCTIONAL MOBILITY: ICD-10-CM

## 2025-03-06 PROCEDURE — 97530 THERAPEUTIC ACTIVITIES: CPT | Mod: KX,PN,CQ

## 2025-03-06 PROCEDURE — 97110 THERAPEUTIC EXERCISES: CPT | Mod: KX,PN,CQ

## 2025-03-06 PROCEDURE — 97112 NEUROMUSCULAR REEDUCATION: CPT | Mod: KX,PN,CQ

## 2025-03-06 NOTE — PROGRESS NOTES
Outpatient Rehab    Physical Therapy Visit    Patient Name: Rut Hurtado  MRN: [id number]  YOB: 1965  Encounter Date: 3/6/2025    Therapy Diagnosis:   Encounter Diagnoses   Name Primary?    Decreased strength Yes    Muscle tightness     Decreased functional mobility      Physician: Vladislav Sanchez DPM    Physician Orders: Eval and Treat  Medical Diagnosis: S43.014S (ICD-10-CM) - Closed anterior dislocation of right shoulder, sequela      Visit # / Visits Authorized:  4 / 12   Insurance Authorization Period: 1/10/2025 to 12/31/2025  Plan of Care Certification:   to 4/1/2025      Time In: 1000   Time Out: 1055  Total Time: 55   Total Billable Time: 55    FOTO:  Intake Score:  %  Survey Score 1:  %  Survey Score 2:  %         Subjective   little stiff today.         Objective     Therapeutic exercise x 10 minutes including the following:     UBE 3/3  Scapular squeezes  3x10      Neuro reeducation x 35 minutes including the following:     Shoulder rolls 3x10     Pulleys 3'/3' each direction  (flexion/scaption) - np     Upper trap stretches 3x30 secs on R  +Bilateral External Rotation Red Theraband x 30    Supine shoulder flexion with dowel  3x10  SA punches with dowel  3x10     Sidelying ER  2#  2x10  Prone extension 1# 2x10  Prone rows  1# 2x10  Open books x20 B     Seated trunk extension over half bolster x 30  IR/ER with red theraband  3 x 10 each     Wall slides x 30 with towel  (flexion)     Therapeutic activities x 10 minutes including the following:      CC Rows 3#  2x10  CC Shoulder extension 3#  2x10  CC Shoulder adduction 3#  2x10   - np    Assessment & Plan   Assessment: Rut tolerated PRE's with good form and no increase in s/s.  VC for correct exercise technique.  Continued strengthening to improve functioal activities, hobbies.       Patient will continue to benefit from skilled outpatient physical therapy to address the deficits listed in the problem list box on initial evaluation, provide  pt/family education and to maximize pt's level of independence in the home and community environment.     Patient's spiritual, cultural, and educational needs considered and patient agreeable to plan of care and goals.           Plan: cont per poc    Goals:   Right Foot/Ankle Pain Problems       Right Foot/Ankle Pain Problems (Active)       LTG       Patient will return to normal ADL, recreational, and work related activities with less pain and limitation.  (Progressing)       Start:  02/04/25    Expected End:  04/01/25            Pnt to achieve full shoulder AROM with 1/10 pain  (Progressing)       Start:  02/04/25    Expected End:  04/01/25            Patient will meet predicted functional outcome (FOTO) score: 80% to increase self-worth & perceived functional ability.  (Progressing)       Start:  02/04/25    Expected End:  04/01/25               STG       Recent signs and systems trend is improving in order to progress towards LTG's.  (Progressing)       Start:  02/04/25    Expected End:  03/04/25            Patient will be independent with HEP in order to further progress and return to maximal function.  (Progressing)       Start:  02/04/25    Expected End:  03/04/25            Patient will be able to correct postural deviations in sitting and standing, to decrease pain and promote postural awareness for injury prevention.  (Progressing)       Start:  02/04/25    Expected End:  03/04/25                 R shoulder pain Problems       R shoulder pain Problems (Active)       LTG       Patient will return to normal ADL, recreational, and work related activities with less pain and limitation.  (Progressing)       Start:  02/18/25    Expected End:  04/15/25            Patient will meet predicted functional outcome (FOTO) score: 80% to increase self-worth & perceived functional ability.  (Progressing)       Start:  02/18/25    Expected End:  04/15/25            Patient will have met/partially met personal goal of being  able to reach overhead with no pain. (Progressing)       Start:  02/18/25    Expected End:  04/15/25               STG       Recent signs and systems trend is improving in order to progress towards LTG's.  (Progressing)       Start:  02/18/25    Expected End:  03/18/25            Patient will be independent with HEP in order to further progress and return to maximal function.  (Progressing)       Start:  02/18/25    Expected End:  03/18/25            Patient will be able to correct postural deviations in sitting and standing, to decrease pain and promote postural awareness for injury prevention.  (Progressing)       Start:  02/18/25    Expected End:  03/18/25                   Ann Nails, PTA

## 2025-03-07 ENCOUNTER — DOCUMENTATION ONLY (OUTPATIENT)
Dept: REHABILITATION | Facility: HOSPITAL | Age: 60
End: 2025-03-07
Payer: COMMERCIAL

## 2025-03-07 NOTE — PROGRESS NOTES
PT/PTA met face to face to discuss pt's treatment plan and progress towards established goals. Pt will be seen by a physical therapist minimally every 6th visit or every 30 days.    Ann Nails PTA

## 2025-03-11 ENCOUNTER — CLINICAL SUPPORT (OUTPATIENT)
Dept: REHABILITATION | Facility: HOSPITAL | Age: 60
End: 2025-03-11
Payer: COMMERCIAL

## 2025-03-11 DIAGNOSIS — R53.1 DECREASED STRENGTH: Primary | ICD-10-CM

## 2025-03-11 DIAGNOSIS — M62.89 MUSCLE TIGHTNESS: ICD-10-CM

## 2025-03-11 DIAGNOSIS — R26.89 DECREASED FUNCTIONAL MOBILITY: ICD-10-CM

## 2025-03-11 PROCEDURE — 97110 THERAPEUTIC EXERCISES: CPT | Mod: PN,CQ

## 2025-03-11 PROCEDURE — 97112 NEUROMUSCULAR REEDUCATION: CPT | Mod: PN,CQ

## 2025-03-11 PROCEDURE — 97530 THERAPEUTIC ACTIVITIES: CPT | Mod: PN,CQ

## 2025-03-11 NOTE — PROGRESS NOTES
Outpatient Rehab    Physical Therapy Visit    Patient Name: Rut Hurtado  MRN: [id number]  YOB: 1965  Encounter Date: 3/11/2025    Therapy Diagnosis:   Encounter Diagnoses   Name Primary?    Decreased strength Yes    Muscle tightness     Decreased functional mobility      Physician: Vladislav Sanchez DPM    Physician Orders: Eval and Treat  Medical Diagnosis: S43.014S (ICD-10-CM) - Closed anterior dislocation of right shoulder, sequela      Visit # / Visits Authorized:  5 / 12   Insurance Authorization Period: 1/10/2025 to 12/31/2025  Plan of Care Certification:   to 4/1/2025      Time In:     Time Out:    Total Time:     Total Billable Time: 55    FOTO:  Intake Score:  %  Survey Score 1:  %  Survey Score 2:  %         Subjective             Objective     Therapeutic exercise x 10 minutes including the following:     UBE 3/3  Scapular squeezes  3x10      Neuro reeducation x 35 minutes including the following:     Shoulder rolls 3x10     Pulleys 3'/3' each direction  (flexion/scaption) - np     Upper trap stretches 3x30 secs on R  Bilateral External Rotation Red Theraband x 30    Supine shoulder flexion with 3# dowel  3x10  SA punches with 3# dowel  3x10     Sidelying ER  2#  2x10  Prone extension 2# 2x10  Prone rows  2# 2x10  Open books x20 B     Seated trunk extension over half bolster x 30  IR/ER with red theraband  3 x 10 each     Wall slides x 30 with towel  (flexion)     Therapeutic activities x 10 minutes including the following:      CC Rows 3#  2x10  CC Shoulder extension 3#  3x10  CC Shoulder adduction 3#  3x10      Assessment & Plan   Assessment:         Patient will continue to benefit from skilled outpatient physical therapy to address the deficits listed in the problem list box on initial evaluation, provide pt/family education and to maximize pt's level of independence in the home and community environment.     Patient's spiritual, cultural, and educational needs considered and patient  agreeable to plan of care and goals.           Plan:      Goals:   Active       LTG       Patient will return to normal ADL, recreational, and work related activities with less pain and limitation.  (Progressing)       Start:  02/18/25    Expected End:  04/15/25            Patient will meet predicted functional outcome (FOTO) score: 80% to increase self-worth & perceived functional ability.  (Progressing)       Start:  02/18/25    Expected End:  04/15/25            Patient will have met/partially met personal goal of being able to reach overhead with no pain. (Progressing)       Start:  02/18/25    Expected End:  04/15/25               STG       Recent signs and systems trend is improving in order to progress towards LTG's.  (Progressing)       Start:  02/18/25    Expected End:  03/18/25            Patient will be independent with HEP in order to further progress and return to maximal function.  (Progressing)       Start:  02/18/25    Expected End:  03/18/25            Patient will be able to correct postural deviations in sitting and standing, to decrease pain and promote postural awareness for injury prevention.  (Progressing)       Start:  02/18/25    Expected End:  03/18/25                Luisa Longoria, PTA

## 2025-03-18 ENCOUNTER — CLINICAL SUPPORT (OUTPATIENT)
Dept: REHABILITATION | Facility: HOSPITAL | Age: 60
End: 2025-03-18
Payer: COMMERCIAL

## 2025-03-18 DIAGNOSIS — R26.89 DECREASED FUNCTIONAL MOBILITY: ICD-10-CM

## 2025-03-18 DIAGNOSIS — R53.1 DECREASED STRENGTH: Primary | ICD-10-CM

## 2025-03-18 DIAGNOSIS — M62.89 MUSCLE TIGHTNESS: ICD-10-CM

## 2025-03-18 PROCEDURE — 97110 THERAPEUTIC EXERCISES: CPT | Mod: PN

## 2025-03-18 PROCEDURE — 97530 THERAPEUTIC ACTIVITIES: CPT | Mod: PN

## 2025-03-18 PROCEDURE — 97112 NEUROMUSCULAR REEDUCATION: CPT | Mod: PN

## 2025-03-18 NOTE — PROGRESS NOTES
Outpatient Rehab    Physical Therapy Visit    Patient Name: Rut Hurtado  MRN: [id number]  YOB: 1965  Encounter Date: 3/18/2025    Therapy Diagnosis:   Encounter Diagnoses   Name Primary?    Decreased strength Yes    Muscle tightness     Decreased functional mobility        Physician: Vladislav Sanchez DPM    Physician Orders: Eval and Treat  Medical Diagnosis: S43.014S (ICD-10-CM) - Closed anterior dislocation of right shoulder, sequela      Visit # / Visits Authorized:  5 / 12   Insurance Authorization Period: 1/10/2025 to 12/31/2025  Plan of Care Certification:   to 4/1/2025      Time In: 1100   Time Out: 1155  Total Time: 55   Total Billable Time: 55    FOTO:  Intake Score:  %  Survey Score 1:  %  Survey Score 2:  %         Subjective   States that she feels like she has been improving..  Pain reported as 3/10.      Objective     Therapeutic exercise x 8 minutes including the following:     UBE 3/3  Scapular squeezes  3x10      Neuro reeducation x 38 minutes including the following:     Shoulder rolls 3x10     Pulleys 3'/3' each direction  (flexion/scaption) - np     Upper trap stretches 3x30 secs on R  Bilateral External Rotation Red Theraband x 30  B shoulder horizontal abduction with red tb 3x10    Supine shoulder flexion with 3# dowel  3x10  SA punches with 3# dowel  3x10     Sidelying ER  2#  2x10  Prone extension 2# 2x10  Prone rows  2# 2x10  Open books x20 B     Seated trunk extension over half bolster x 30  IR/ER with red theraband  3 x 10 each     Wall slides x 30 with towel  (flexion)     Therapeutic activities x 9 minutes including the following:      CC Rows 3#  2x10  CC Shoulder extension 3#  3x10  CC Shoulder adduction 3#  3x10      Assessment & Plan   Assessment: Rut was able to progress strength exercises with no discomfort reported. Anterior aspect of shoulder discomfort when performing adduction. Progress as able and needed.       Patient will continue to benefit from skilled  outpatient physical therapy to address the deficits listed in the problem list box on initial evaluation, provide pt/family education and to maximize pt's level of independence in the home and community environment.     Patient's spiritual, cultural, and educational needs considered and patient agreeable to plan of care and goals.           Plan:      Goals:   Active       LTG       Patient will return to normal ADL, recreational, and work related activities with less pain and limitation.  (Progressing)       Start:  02/18/25    Expected End:  04/15/25            Patient will meet predicted functional outcome (FOTO) score: 80% to increase self-worth & perceived functional ability.  (Progressing)       Start:  02/18/25    Expected End:  04/15/25            Patient will have met/partially met personal goal of being able to reach overhead with no pain. (Progressing)       Start:  02/18/25    Expected End:  04/15/25               STG       Recent signs and systems trend is improving in order to progress towards LTG's.  (Progressing)       Start:  02/18/25    Expected End:  03/18/25            Patient will be independent with HEP in order to further progress and return to maximal function.  (Progressing)       Start:  02/18/25    Expected End:  03/18/25            Patient will be able to correct postural deviations in sitting and standing, to decrease pain and promote postural awareness for injury prevention.  (Progressing)       Start:  02/18/25    Expected End:  03/18/25                Gabe Gonzalez, PT

## 2025-05-14 ENCOUNTER — PATIENT OUTREACH (OUTPATIENT)
Dept: ADMINISTRATIVE | Facility: HOSPITAL | Age: 60
End: 2025-05-14
Payer: COMMERCIAL

## 2025-05-14 NOTE — PROGRESS NOTES
The patient is showing as non-compliant on the Statin Therapy Measure. Patient does not have current statin listed in medication list.         PLACED IN APPT NOTES TO REVIEW     Statin therapy for patients with an LDL >190 or were previously diagnosed with Familial or Pure Hypercholesterolema. -Patients aged 21-75 years             Needs at least one dispensing event for a high- or moderate-intensity statin medication in the measurement year.    Moderate-Intensity Statin Therapy  Atorvastatin 10-20 mg  Pitavastatin 1-4 mg  Simvastatin 20-40 mg  Rosuvastatin 5-10 mg  Pravastatin 40-80 mg  Lovastatin 40 mg  Fluvastatin 40 mg  Fluvastatin XL 80 mg    High-intensity Statin Therapy  Atorvastatin 40-80 mg  Rosuvastatin 20-40 mg  Simvastatin 80 mg    Exclusions: Intolerance needs a 2025 code (myalgia, myositis, myopathy, or rhabdomyolysis) during the current measurement year AND problem list updated and reviewed IF APPLIES.   G72.0  Drug-induced myopathy or rhabdomyolysis   G72.9  Myopathy, unspecified  M60.9  Myositis, unspecified  M79.10 Myalgia, unspecified site

## 2025-07-17 ENCOUNTER — PATIENT MESSAGE (OUTPATIENT)
Dept: FAMILY MEDICINE | Facility: CLINIC | Age: 60
End: 2025-07-17
Payer: COMMERCIAL